# Patient Record
Sex: MALE | Race: WHITE | Employment: OTHER | URBAN - METROPOLITAN AREA
[De-identification: names, ages, dates, MRNs, and addresses within clinical notes are randomized per-mention and may not be internally consistent; named-entity substitution may affect disease eponyms.]

---

## 2017-06-13 ENCOUNTER — OFFICE VISIT (OUTPATIENT)
Dept: CARDIOLOGY CLINIC | Age: 72
End: 2017-06-13

## 2017-06-13 VITALS
SYSTOLIC BLOOD PRESSURE: 160 MMHG | DIASTOLIC BLOOD PRESSURE: 90 MMHG | HEIGHT: 71 IN | BODY MASS INDEX: 28.53 KG/M2 | HEART RATE: 60 BPM | RESPIRATION RATE: 16 BRPM | WEIGHT: 203.8 LBS | OXYGEN SATURATION: 98 %

## 2017-06-13 DIAGNOSIS — I50.33 ACUTE ON CHRONIC DIASTOLIC HEART FAILURE (HCC): Primary | ICD-10-CM

## 2017-06-13 RX ORDER — LISINOPRIL 10 MG/1
20 TABLET ORAL DAILY
COMMUNITY
End: 2020-01-17

## 2017-06-13 NOTE — MR AVS SNAPSHOT
Visit Information Date & Time Provider Department Dept. Phone Encounter #  
 6/13/2017  8:40 AM Esteban Lackey MD [facility] [phone] [id number] Upcoming Health Maintenance Date Due  
 FOOT EXAM Q1 3/20/1955 MICROALBUMIN Q1 3/20/1955 EYE EXAM RETINAL OR DILATED Q1 3/20/1953 DTaP/Tdap/Td series (1 - Tdap) 3/20/1964 FOBT Q 1 YEAR AGE 50-75 3/20/1993 ZOSTER VACCINE AGE 60> 3/20/2003 GLAUCOMA SCREENING Q2Y 3/20/2008 Pneumococcal 65+ Low/Medium Risk (1 of 2 - PCV13) 3/20/2008 MEDICARE YEARLY EXAM 3/20/2008 HEMOGLOBIN A1C Q6M 3/8/2017 INFLUENZA AGE 9 TO ADULT 8/1/2017 LIPID PANEL Q1 5/3/2018 Allergies as of 6/13/2017  Review Complete On: 6/13/2017 By: Marni Herman No Known Allergies Current Immunizations  Reviewed on 7/14/2016 No immunizations on file. Not reviewed this visit You Were Diagnosed With   
  
 Codes Comments Acute on chronic diastolic heart failure (HCC)    -  Primary ICD-10-CM: I50.33 ICD-9-CM: 428.33 Vitals BP Pulse Resp Height(growth percentile) Weight(growth percentile) SpO2  
 160/90 (BP 1 Location: Left arm, BP Patient Position: Sitting) 60 16 5' 11\" (1.803 m) 203 lb 12.8 oz (92.4 kg) 98% BMI Smoking Status 28.42 kg/m2 Former Smoker BMI and BSA Data Body Mass Index Body Surface Area  
 28.42 kg/m 2 2.15 m 2 Preferred Pharmacy Pharmacy Name Phone [facility] Snowden [facility] [phone] Your Updated Medication List  
  
   
This list is accurate as of: 6/13/17  9:20 AM.  Always use your most recent med list.  
  
  
  
  
 ACCU-CHEK SMARTVIEW TEST STRIP strip Generic drug:  glucose blood VI test strips Alpha Lipoic Acid 600 mg Cap Take  by mouth. apixaban 5 mg tablet Commonly known as:  Jeana Cork Take 1 Tab by mouth two (2) times a day. aspirin delayed-release 81 mg tablet Take  by mouth daily. atorvastatin 20 mg tablet Commonly known as:  LIPITOR Take 1 Tab by mouth nightly. cholecalciferol (vitamin D3) 2,000 unit Tab Take  by mouth. chromium picolinate 200 mcg Cap Take  by mouth.  
  
 cyanocobalamin 1,000 mcg sublingual tablet Commonly known as:  VITAMIN B-12 Take 5,000 mcg by mouth daily. folic acid 822 mcg tablet Take 800 mcg by mouth daily. furosemide 40 mg tablet Commonly known as:  LASIX TAKE 1 TABLET DAILY HumaLOG KwikPen 100 unit/mL kwikpen Generic drug:  insulin lispro 8 Units Before breakfast, lunch, and dinner. insulin glargine 100 unit/mL injection Commonly known as:  LANTUS  
14 Units by SubCUTAneous route nightly. Patient takes as directed. lisinopril 10 mg tablet Commonly known as:  Rexanne  Take 10 mg by mouth two (2) times a day. Magnesium Hydroxide 400 mg (170 mg) Chew Take 400 mg by mouth daily. Patient states triple magnesium 400 mg daily MEGARED OMEGA-3 KRILL OIL PO Take 350 mg by mouth daily. metFORMIN 500 mg tablet Commonly known as:  GLUCOPHAGE Take  by mouth two (2) times daily (with meals). metoprolol tartrate 50 mg tablet Commonly known as:  LOPRESSOR Take 50 mg by mouth two (2) times a day. multivitamin tablet Commonly known as:  ONE A DAY Take 1 Tab by mouth daily. potassium chloride SA 10 mEq capsule Commonly known as:  [place] Take 2 Caps by mouth daily. This was a different dose than you were previously taking!!  
  
 selenium 200 mcg Tab Take  by mouth. UBIQUINOL (BULK) Take 100 mg by mouth daily. vitamin A 10,000 unit capsule Commonly known as:  AQUASOL A Take 10,000 Units by mouth daily. VITAMIN B-1 PO Take 160 mg by mouth daily. vitamin E 400 unit capsule Commonly known as:  [address] Take  by mouth daily. zinc 15 mg Tab Take 30 mg by mouth daily. We Performed the Following AMB POC EKG ROUTINE W/ 12 LEADS, INTER & REP [46206 CPT(R)] Patient Instructions Lisinopril 10 mg twice a day Follow up with Marvin Raymond in 6 months Introducing [facility]! [facility] introduces [employer]ia patient portal. Now you can access parts of your medical record, email your doctor's office, and request medication refills online. 1. In your internet browser, go to [email or web address] 2. Click on the First Time User? Click Here link in the Sign In box. You will see the New Member Sign Up page. 3. Enter your Recognia Access Code exactly as it appears below. You will not need to use this code after youve completed the sign-up process. If you do not sign up before the expiration date, you must request a new code. · Recognia Access Code: [email or web address] Expires: 8/15/2017  7:33 AM 
 
4. Enter the last four digits of your Social Security Number (xxxx) and Date of Birth (mm/dd/yyyy) as indicated and click Submit. You will be taken to the next sign-up page. 5. Create a Recognia ID. This will be your Recognia login ID and cannot be changed, so think of one that is secure and easy to remember. 6. Create a Recognia password. You can change your password at any time. 7. Enter your Password Reset Question and Answer. This can be used at a later time if you forget your password. 8. Enter your e-mail address. You will receive e-mail notification when new information is available in [address] 9. Click Sign Up. You can now view and download portions of your medical record. 10. Click the Download Summary menu link to download a portable copy of your medical information. If you have questions, please visit the Frequently Asked Questions section of the Recognia website. Remember, Recognia is NOT to be used for urgent needs. For medical emergencies, dial 911. Now available from your iPhone and Android! Please provide this summary of care documentation to your next provider. Your primary care clinician is listed as Romayne Ruby. If you have any questions after today's visit, please call [phone].
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
## 2017-06-13 NOTE — PROGRESS NOTES
Visit Vitals    /90 (BP 1 Location: Left arm, BP Patient Position: Sitting)    Pulse 60    Resp 16    Ht 5' 11\" (1.803 m)    Wt 203 lb 12.8 oz (92.4 kg)    SpO2 98%    BMI 28.42 kg/m2

## 2017-10-11 NOTE — TELEPHONE ENCOUNTER
Requested Prescriptions     Pending Prescriptions Disp Refills    apixaban (ELIQUIS) 5 mg tablet 180 Tab 2     Last OV 6/13/17  Next OV 12/12/17  Pt is out of this medication and is leaving out of town tomorrow morning for at least 2 weeks. Please call pt when prescription is sent.     Pharmacy verified    Thank you, AP

## 2017-12-12 ENCOUNTER — OFFICE VISIT (OUTPATIENT)
Dept: CARDIOLOGY CLINIC | Age: 72
End: 2017-12-12

## 2017-12-12 VITALS
BODY MASS INDEX: 29.12 KG/M2 | HEART RATE: 71 BPM | HEIGHT: 71 IN | OXYGEN SATURATION: 98 % | RESPIRATION RATE: 16 BRPM | DIASTOLIC BLOOD PRESSURE: 80 MMHG | WEIGHT: 208 LBS | SYSTOLIC BLOOD PRESSURE: 120 MMHG

## 2017-12-12 DIAGNOSIS — I25.10 CORONARY ARTERY DISEASE INVOLVING NATIVE CORONARY ARTERY OF NATIVE HEART WITHOUT ANGINA PECTORIS: Primary | ICD-10-CM
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
## 2017-12-12 NOTE — MR AVS SNAPSHOT
Visit Information Date & Time Provider Department Dept. Phone Encounter #  
 12/12/2017 10:00 AM Junaid Padgett MD [facility] Jakob Ferris [phone] [id number] Upcoming Health Maintenance Date Due  
 FOOT EXAM Q1 3/20/1955 MICROALBUMIN Q1 3/20/1955 EYE EXAM RETINAL OR DILATED Q1 3/20/1953 DTaP/Tdap/Td series (1 - Tdap) 3/20/1964 FOBT Q 1 YEAR AGE 50-75 3/20/1993 ZOSTER VACCINE AGE 60> 1/20/2003 GLAUCOMA SCREENING Q2Y 3/20/2008 Pneumococcal 65+ Low/Medium Risk (1 of 2 - PCV13) 3/20/2008 MEDICARE YEARLY EXAM 3/20/2008 Influenza Age 5 to Adult 8/1/2017 HEMOGLOBIN A1C Q6M 11/3/2017 LIPID PANEL Q1 5/3/2018 Allergies as of 12/12/2017  Review Complete On: 12/12/2017 By: Junaid Padgett MD  
 No Known Allergies Current Immunizations  Reviewed on 7/14/2016 No immunizations on file. Not reviewed this visit You Were Diagnosed With   
  
 Codes Comments Coronary artery disease involving native coronary artery of native heart without angina pectoris    -  Primary ICD-10-CM: I25.10 ICD-9-CM: 414.01 Vitals BP Pulse Resp Height(growth percentile) Weight(growth percentile) SpO2  
 120/80 (BP 1 Location: Left arm, BP Patient Position: Sitting) 71 16 5' 11\" (1.803 m) 208 lb (94.3 kg) 98% BMI Smoking Status 29.01 kg/m2 Former Smoker Vitals History BMI and BSA Data Body Mass Index Body Surface Area  
 29.01 kg/m 2 2.17 m 2 Preferred Pharmacy Pharmacy Name Phone [address] [facility] [phone] Your Updated Medication List  
  
   
This list is accurate as of: 12/12/17 11:09 AM.  Always use your most recent med list.  
  
  
  
  
 ACCU-CHEK SMARTVIEW TEST STRIP strip Generic drug:  glucose blood VI test strips Alpha Lipoic Acid 600 mg Cap Take  by mouth. apixaban 5 mg tablet Commonly known as:  Timmothy Madison TAKE 1 TABLET TWICE A DAY  
  
 aspirin delayed-release 81 mg tablet Take  by mouth daily. atorvastatin 20 mg tablet Commonly known as:  LIPITOR  
TAKE 1 TABLET NIGHTLY  
  
 cholecalciferol (vitamin D3) 2,000 unit Tab Take  by mouth. chromium picolinate 200 mcg Cap Take  by mouth.  
  
 cyanocobalamin 1,000 mcg sublingual tablet Commonly known as:  VITAMIN B-12 Take 5,000 mcg by mouth daily. folic acid 698 mcg tablet Take 800 mcg by mouth daily. furosemide 40 mg tablet Commonly known as:  LASIX TAKE 1 TABLET DAILY HumaLOG KwikPen 100 unit/mL kwikpen Generic drug:  insulin lispro 8 Units Before breakfast, lunch, and dinner. insulin glargine 100 unit/mL injection Commonly known as:  LANTUS  
14 Units by SubCUTAneous route nightly. Patient takes as directed. lisinopril 10 mg tablet Commonly known as:  Marge Gem Take 10 mg by mouth two (2) times a day. Magnesium Hydroxide 400 mg (170 mg) Chew Take 400 mg by mouth daily. Patient states triple magnesium 400 mg daily MEGARED OMEGA-3 KRILL OIL PO Take 350 mg by mouth daily. metFORMIN 500 mg tablet Commonly known as:  GLUCOPHAGE Take  by mouth two (2) times daily (with meals). metoprolol tartrate 50 mg tablet Commonly known as:  LOPRESSOR Take 50 mg by mouth two (2) times a day. multivitamin tablet Commonly known as:  ONE A DAY Take 1 Tab by mouth daily. potassium chloride SA 10 mEq capsule Commonly known as:  MICRO-K  
TAKE 2 CAPSULES DAILY  
  
 selenium 200 mcg Tab Take  by mouth. UBIQUINOL (BULK) Take 100 mg by mouth daily. vitamin A 10,000 unit capsule Commonly known as:  AQUASOL A Take 10,000 Units by mouth daily. VITAMIN B-1 PO Take 160 mg by mouth daily. vitamin E 400 unit capsule Commonly known as:  [address] Take  by mouth daily. zinc 15 mg Tab Take 30 mg by mouth daily. We Performed the Following AMB POC EKG ROUTINE W/ 12 LEADS, INTER & REP [55588 CPT(R)] Patient Instructions Follow up with Romana Molina in 6 months Introducing [address]! Alisha Bee introduces [employer] patient portal. Now you can access parts of your medical record, email your doctor's office, and request medication refills online. 1. In your internet browser, go to [email or web address] 2. Click on the First Time User? Click Here link in the Sign In box. You will see the New Member Sign Up page. 3. Enter your [employer] Access Code exactly as it appears below. You will not need to use this code after youve completed the sign-up process. If you do not sign up before the expiration date, you must request a new code. · [employer] Access Code: [email or web address] Expires: 3/8/2018  8:36 AM 
 
4. Enter the last four digits of your Social Security Number (xxxx) and Date of Birth (mm/dd/yyyy) as indicated and click Submit. You will be taken to the next sign-up page. 5. Create a [employer] ID. This will be your [employer] login ID and cannot be changed, so think of one that is secure and easy to remember. 6. Create a [employer] password. You can change your password at any time. 7. Enter your Password Reset Question and Answer. This can be used at a later time if you forget your password. 8. Enter your e-mail address. You will receive e-mail notification when new information is available in [address] 9. Click Sign Up. You can now view and download portions of your medical record. 10. Click the Download Summary menu link to download a portable copy of your medical information. If you have questions, please visit the Frequently Asked Questions section of the Bellst[employer] website. Remember, [employer] is NOT to be used for urgent needs. For medical emergencies, dial 911. Now available from your iPhone and Android! Please provide this summary of care documentation to your next provider. Your primary care clinician is listed as Mary Jo Cardozo. If you have any questions after today's visit, please call [phone].

## 2018-06-12 ENCOUNTER — OFFICE VISIT (OUTPATIENT)
Dept: CARDIOLOGY CLINIC | Age: 73
End: 2018-06-12

## 2018-06-12 VITALS
SYSTOLIC BLOOD PRESSURE: 140 MMHG | DIASTOLIC BLOOD PRESSURE: 90 MMHG | WEIGHT: 204 LBS | RESPIRATION RATE: 16 BRPM | OXYGEN SATURATION: 98 % | BODY MASS INDEX: 28.56 KG/M2 | HEIGHT: 71 IN | HEART RATE: 61 BPM

## 2018-06-12 DIAGNOSIS — I48.0 PAROXYSMAL ATRIAL FIBRILLATION (HCC): Primary | ICD-10-CM

## 2018-06-12 RX ORDER — HYDROCHLOROTHIAZIDE 12.5 MG/1
25 TABLET ORAL DAILY
COMMUNITY
Start: 2018-04-17

## 2018-06-12 RX ORDER — ATORVASTATIN CALCIUM 40 MG/1
40 TABLET, FILM COATED ORAL
COMMUNITY
End: 2018-06-15 | Stop reason: SDUPTHER

## 2018-06-12 RX ORDER — ASPIRIN 81 MG/1
81 TABLET ORAL DAILY
COMMUNITY
End: 2021-04-26 | Stop reason: SDUPTHER

## 2018-06-12 NOTE — PATIENT INSTRUCTIONS
Stop Eliquis    Increase Aspirin 81 mg (2 Tabs) daily    Increase Lipitor 40 mg daily    Follow up with Luz Luong in 6 months

## 2018-06-12 NOTE — MR AVS SNAPSHOT
[address] [address] [facility] [place] [facility] 
[phone] Patient: Gina Dick MRN: [id number] COL:[birth year] Visit Information Date & Time Provider Department Dept. Phone Encounter #  
 6/12/2018 11:20 AM Abena Kilpatrick MD [facility] Jammie Jenkins [phone] [id number] Follow-up Instructions Return in about 6 months (around 12/12/2018). Follow-up and Disposition History Upcoming Health Maintenance Date Due  
 FOOT EXAM Q1 3/20/1955 MICROALBUMIN Q1 3/20/1955 EYE EXAM RETINAL OR DILATED Q1 3/20/1953 DTaP/Tdap/Td series (1 - Tdap) 3/20/1964 ZOSTER VACCINE AGE 60> 1/20/2003 GLAUCOMA SCREENING Q2Y 3/20/2008 Pneumococcal 65+ Low/Medium Risk (1 of 2 - PCV13) 3/20/2008 HEMOGLOBIN A1C Q6M 11/3/2017 MEDICARE YEARLY EXAM 3/14/2018 LIPID PANEL Q1 5/3/2018 Influenza Age 5 to Adult 8/1/2018 Allergies as of 6/12/2018  Review Complete On: 6/12/2018 By: Abena Kilpatrick MD  
 No Known Allergies Current Immunizations  Reviewed on 7/14/2016 No immunizations on file. Not reviewed this visit You Were Diagnosed With   
  
 Codes Comments Paroxysmal atrial fibrillation (HCC)    -  Primary ICD-10-CM: I48.0 ICD-9-CM: 427.31 Vitals BP Pulse Resp Height(growth percentile) Weight(growth percentile) SpO2  
 140/90 (BP 1 Location: Left arm, BP Patient Position: Sitting) 61 16 5' 11\" (1.803 m) 204 lb (92.5 kg) 98% BMI Smoking Status 28.45 kg/m2 Former Smoker BMI and BSA Data Body Mass Index Body Surface Area  
 28.45 kg/m 2 2.15 m 2 Preferred Pharmacy Pharmacy Name Phone 100 [address] [facility] [phone] Your Updated Medication List  
  
   
This list is accurate as of 6/12/18 11:30 AM.  Always use your most recent med list.  
  
  
  
  
 ACCU-CHEK SMARTVIEW TEST STRIP strip Generic drug:  glucose blood VI test strips Alpha Lipoic Acid 600 mg Cap Take  by mouth. aspirin delayed-release 81 mg tablet Take 81 mg by mouth daily. 2 tablets daily  
  
 cholecalciferol (vitamin D3) 2,000 unit Tab Take  by mouth. chromium picolinate 200 mcg Cap Take  by mouth.  
  
 cyanocobalamin 1,000 mcg sublingual tablet Commonly known as:  VITAMIN B-12 Take 5,000 mcg by mouth daily. folic acid 266 mcg tablet Take 800 mcg by mouth daily. HumaLOG KwikPen Insulin 100 unit/mL kwikpen Generic drug:  insulin lispro 8 Units Before breakfast, lunch, and dinner. hydroCHLOROthiazide 12.5 mg tablet Commonly known as:  HYDRODIURIL Take 12.5 mg by mouth every other day. insulin glargine 100 unit/mL injection Commonly known as:  LANTUS  
14 Units by SubCUTAneous route nightly. Patient takes as directed. LIPITOR 40 mg tablet Generic drug:  atorvastatin Take 40 mg by mouth nightly. lisinopril 10 mg tablet Commonly known as:  Lollie Jump Take 20 mg by mouth two (2) times a day. Magnesium Hydroxide 400 mg (170 mg) Chew Take 400 mg by mouth daily. Patient states triple magnesium 400 mg daily MEGARED OMEGA-3 KRILL OIL PO Take 350 mg by mouth daily. metFORMIN 500 mg tablet Commonly known as:  GLUCOPHAGE Take  by mouth two (2) times daily (with meals). metoprolol tartrate 50 mg tablet Commonly known as:  LOPRESSOR Take 50 mg by mouth two (2) times a day. multivitamin tablet Commonly known as:  ONE A DAY Take 1 Tab by mouth daily. selenium 200 mcg Tab Take  by mouth. UBIQUINOL (BULK) Take 100 mg by mouth daily. vitamin A 10,000 unit capsule Commonly known as:  AQUASOL A Take 10,000 Units by mouth daily. VITAMIN B-1 PO Take 160 mg by mouth daily. vitamin E 400 unit capsule Commonly known as:  [address] Take  by mouth daily. zinc 15 mg Tab Take 30 mg by mouth daily. We Performed the Following AMB POC EKG ROUTINE W/ 12 LEADS, INTER & REP [53622 CPT(R)] Follow-up Instructions Return in about 6 months (around 12/12/2018). Patient Instructions Stop Eliquis Increase Aspirin 81 mg (2 Tabs) daily Increase Lipitor 40 mg daily Follow up with Velma Cruz in 6 months Introducing [facility]! Carley Serrano introduces [employer] patient portal. Now you can access parts of your medical record, email your doctor's office, and request medication refills online. 1. In your internet browser, go to [email or web address] 2. Click on the First Time User? Click Here link in the Sign In box. You will see the New Member Sign Up page. 3. Enter your [employer] Access Code exactly as it appears below. You will not need to use this code after youve completed the sign-up process. If you do not sign up before the expiration date, you must request a new code. · [employer] Access Code: [email or web address] Expires: 8/21/2018  7:21 AM 
 
4. Enter the last four digits of your Social Security Number (xxxx) and Date of Birth (mm/dd/yyyy) as indicated and click Submit. You will be taken to the next sign-up page. 5. Create a [employer] ID. This will be your [employer] login ID and cannot be changed, so think of one that is secure and easy to remember. 6. Create a [employer] password. You can change your password at any time. 7. Enter your Password Reset Question and Answer. This can be used at a later time if you forget your password. 8. Enter your e-mail address. You will receive e-mail notification when new information is available in [address] 9. Click Sign Up. You can now view and download portions of your medical record. 10. Click the Download Summary menu link to download a portable copy of your medical information.  
 
If you have questions, please visit the Frequently Asked Questions section of the v2tel. Remember, [employer] is NOT to be used for urgent needs. For medical emergencies, dial 911. Now available from your iPhone and Android! Please provide this summary of care documentation to your next provider. Your primary care clinician is listed as Cleatus Heads. If you have any questions after today's visit, please call [phone].
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
## 2018-06-15 ENCOUNTER — TELEPHONE (OUTPATIENT)
Dept: CARDIOLOGY CLINIC | Age: 73
End: 2018-06-15

## 2018-06-15 RX ORDER — ATORVASTATIN CALCIUM 40 MG/1
40 TABLET, FILM COATED ORAL
Qty: 90 TAB | Refills: 3 | Status: SHIPPED | OUTPATIENT
Start: 2018-06-15 | End: 2019-01-21 | Stop reason: ALTCHOICE

## 2018-06-15 NOTE — TELEPHONE ENCOUNTER
Pt called to discuss changed medication that was recently done on his Atorvastatim(Lipitor) 20mg, wanting to be assured that the information was submitted to his mail ordering supply. Pt can be reached at #[phone].   Thanks

## 2018-06-15 NOTE — TELEPHONE ENCOUNTER
Verified patient with two patient identifiers. Spoke with patient and told him that I [facility] his Lipitor 40 mg # 90 [facility]

## 2019-01-21 ENCOUNTER — OFFICE VISIT (OUTPATIENT)
Dept: CARDIOLOGY CLINIC | Age: 74
End: 2019-01-21

## 2019-01-21 VITALS
HEART RATE: 57 BPM | DIASTOLIC BLOOD PRESSURE: 92 MMHG | OXYGEN SATURATION: 98 % | HEIGHT: 71 IN | SYSTOLIC BLOOD PRESSURE: 148 MMHG | RESPIRATION RATE: 19 BRPM | WEIGHT: 207 LBS | BODY MASS INDEX: 28.98 KG/M2

## 2019-01-21 DIAGNOSIS — I25.10 CORONARY ARTERY DISEASE INVOLVING NATIVE CORONARY ARTERY WITHOUT ANGINA PECTORIS, UNSPECIFIED WHETHER NATIVE OR TRANSPLANTED HEART: Primary | ICD-10-CM

## 2019-01-21 NOTE — PROGRESS NOTES
Visit Vitals BP (!) 148/92 (BP 1 Location: Right arm, BP Patient Position: Sitting) Pulse (!) 57 Resp 19 Ht 5' 11\" (1.803 m) Wt 207 lb (93.9 kg) SpO2 98% BMI 28.87 kg/m²

## 2019-01-21 NOTE — PROGRESS NOTES
1. Have you been to the ER, urgent care clinic since your last visit? Hospitalized since your last visit? No 
 
2. Have you seen or consulted any other health care providers outside of the [address] [place] [address] since your last visit? Include any pap smears or colon screening. No 
 
3) Do you have an Advance Directive on file? no 
 
Patient is accompanied by self I have received verbal consent from Zainab Rios to discuss any/all medical information while they are present in the room. Visit Vitals BP (!) 148/92 (BP 1 Location: Right arm, BP Patient Position: Sitting) Pulse (!) 57 Resp 19 Ht 5' 11\" (1.803 m) Wt 207 lb (93.9 kg) SpO2 98% BMI 28.87 kg/m²
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
## 2019-01-30 ENCOUNTER — TELEPHONE (OUTPATIENT)
Dept: CARDIOLOGY CLINIC | Age: 74
End: 2019-01-30

## 2019-01-30 NOTE — TELEPHONE ENCOUNTER
[id number] [facility] called from [place] stating she would like the pt bp reading from his last office visit.   Thanks

## 2019-01-30 NOTE — TELEPHONE ENCOUNTER
[facility] states that they wanted to see what the last BP check was and when his next appt is. Advised him that I am unable to give out this information without patient permission and that they can contact the patient for this information.

## 2019-07-16 ENCOUNTER — OFFICE VISIT (OUTPATIENT)
Dept: CARDIOLOGY CLINIC | Age: 74
End: 2019-07-16

## 2019-07-16 VITALS
HEIGHT: 71 IN | HEART RATE: 61 BPM | DIASTOLIC BLOOD PRESSURE: 80 MMHG | WEIGHT: 200 LBS | BODY MASS INDEX: 28 KG/M2 | SYSTOLIC BLOOD PRESSURE: 120 MMHG | OXYGEN SATURATION: 98 % | RESPIRATION RATE: 18 BRPM

## 2019-07-16 DIAGNOSIS — I25.10 CORONARY ARTERY DISEASE INVOLVING NATIVE CORONARY ARTERY WITHOUT ANGINA PECTORIS, UNSPECIFIED WHETHER NATIVE OR TRANSPLANTED HEART: Primary | ICD-10-CM
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
## 2019-07-16 NOTE — PROGRESS NOTES
Visit Vitals  /80 (BP 1 Location: Left arm, BP Patient Position: Sitting)   Pulse 61   Resp 18   Ht 5' 11\" (1.803 m)   Wt 200 lb (90.7 kg)   SpO2 98%   BMI 27.89 kg/m²

## 2019-08-09 RX ORDER — LANOLIN ALCOHOL/MO/W.PET/CERES
400 CREAM (GRAM) TOPICAL DAILY
COMMUNITY

## 2019-08-09 RX ORDER — ASCORBIC ACID 500 MG
500 TABLET ORAL DAILY
COMMUNITY

## 2019-08-09 NOTE — PERIOP NOTES
PAT TELEPHONE INTERVIEW COMPLETED. INSTRUCTIONS GIVEN ON MEDICATIONS TO STOP TAKING AND MEDICATIONS TO TAKE THE DAY OF SURGERY. PATIENT ADVISED THAT HE MAY EAT AND DRINK LIQUIDS 8 HOURS PRIOR TO SURGERY. INSTRUCTIONS GIVEN ON USE OF CHLORHEXIDINE SOAP THE EVENING BEFORE AND THE MORNING OF SURGEYR. PATIENT VOICED UNDERSTANDING OF SAME.

## 2019-08-13 ENCOUNTER — ANESTHESIA EVENT (OUTPATIENT)
Dept: SURGERY | Age: 74
End: 2019-08-13
Payer: MEDICARE

## 2019-08-14 ENCOUNTER — APPOINTMENT (OUTPATIENT)
Dept: GENERAL RADIOLOGY | Age: 74
End: 2019-08-14
Attending: PODIATRIST
Payer: MEDICARE

## 2019-08-14 ENCOUNTER — ANESTHESIA (OUTPATIENT)
Dept: SURGERY | Age: 74
End: 2019-08-14
Payer: MEDICARE

## 2019-08-14 ENCOUNTER — HOSPITAL ENCOUNTER (OUTPATIENT)
Age: 74
Setting detail: OUTPATIENT SURGERY
Discharge: HOME | End: 2019-08-14
Attending: PODIATRIST | Admitting: PODIATRIST
Payer: MEDICARE

## 2019-08-14 VITALS
OXYGEN SATURATION: 97 % | BODY MASS INDEX: 28 KG/M2 | HEIGHT: 71 IN | SYSTOLIC BLOOD PRESSURE: 170 MMHG | DIASTOLIC BLOOD PRESSURE: 87 MMHG | HEART RATE: 65 BPM | WEIGHT: 200 LBS | RESPIRATION RATE: 22 BRPM | TEMPERATURE: 98.2 F

## 2019-08-14 LAB
GLUCOSE BLD STRIP.AUTO-MCNC: 127 MG/DL (ref 65–100)
GLUCOSE BLD STRIP.AUTO-MCNC: 151 MG/DL (ref 65–100)
SERVICE CMNT-IMP: ABNORMAL
SERVICE CMNT-IMP: ABNORMAL

## 2019-08-14 PROCEDURE — 77030039497 HC CST PAD STERILE CHCS -A: Performed by: PODIATRIST

## 2019-08-14 PROCEDURE — 74011000250 HC RX REV CODE- 250: Performed by: NURSE ANESTHETIST, CERTIFIED REGISTERED

## 2019-08-14 PROCEDURE — 74011250636 HC RX REV CODE- 250/636: Performed by: NURSE ANESTHETIST, CERTIFIED REGISTERED

## 2019-08-14 PROCEDURE — 77030006831 HC BLD SAW SAG MCRA -B: Performed by: PODIATRIST

## 2019-08-14 PROCEDURE — 77030031139 HC SUT VCRL2 J&J -A: Performed by: PODIATRIST

## 2019-08-14 PROCEDURE — 87205 SMEAR GRAM STAIN: CPT

## 2019-08-14 PROCEDURE — 74011000250 HC RX REV CODE- 250: Performed by: ANESTHESIOLOGY

## 2019-08-14 PROCEDURE — 77030020782 HC GWN BAIR PAWS FLX 3M -B

## 2019-08-14 PROCEDURE — P9045 ALBUMIN (HUMAN), 5%, 250 ML: HCPCS | Performed by: NURSE ANESTHETIST, CERTIFIED REGISTERED

## 2019-08-14 PROCEDURE — 87075 CULTR BACTERIA EXCEPT BLOOD: CPT

## 2019-08-14 PROCEDURE — 77030021122 HC SPLNT MAT FST BSNM -A: Performed by: PODIATRIST

## 2019-08-14 PROCEDURE — 77030027714 HC DRN WND KT TLS STRY -B: Performed by: PODIATRIST

## 2019-08-14 PROCEDURE — 77030018836 HC SOL IRR NACL ICUM -A: Performed by: PODIATRIST

## 2019-08-14 PROCEDURE — 88311 DECALCIFY TISSUE: CPT

## 2019-08-14 PROCEDURE — 77030018846 HC SOL IRR STRL H20 ICUM -A: Performed by: PODIATRIST

## 2019-08-14 PROCEDURE — 77030011640 HC PAD GRND REM COVD -A: Performed by: PODIATRIST

## 2019-08-14 PROCEDURE — 87077 CULTURE AEROBIC IDENTIFY: CPT

## 2019-08-14 PROCEDURE — 87176 TISSUE HOMOGENIZATION CULTR: CPT

## 2019-08-14 PROCEDURE — 77030002933 HC SUT MCRYL J&J -A: Performed by: PODIATRIST

## 2019-08-14 PROCEDURE — 82962 GLUCOSE BLOOD TEST: CPT

## 2019-08-14 PROCEDURE — 77030000032 HC CUF TRNQT ZIMM -B: Performed by: PODIATRIST

## 2019-08-14 PROCEDURE — 74011000250 HC RX REV CODE- 250: Performed by: PODIATRIST

## 2019-08-14 PROCEDURE — 76060000035 HC ANESTHESIA 2 TO 2.5 HR: Performed by: PODIATRIST

## 2019-08-14 PROCEDURE — 76010000131 HC OR TIME 2 TO 2.5 HR: Performed by: PODIATRIST

## 2019-08-14 PROCEDURE — 76210000016 HC OR PH I REC 1 TO 1.5 HR: Performed by: PODIATRIST

## 2019-08-14 PROCEDURE — 88305 TISSUE EXAM BY PATHOLOGIST: CPT

## 2019-08-14 PROCEDURE — 77030002916 HC SUT ETHLN J&J -A: Performed by: PODIATRIST

## 2019-08-14 PROCEDURE — 87186 SC STD MICRODIL/AGAR DIL: CPT

## 2019-08-14 PROCEDURE — 73620 X-RAY EXAM OF FOOT: CPT

## 2019-08-14 PROCEDURE — 74011250636 HC RX REV CODE- 250/636: Performed by: ANESTHESIOLOGY

## 2019-08-14 PROCEDURE — 74011250636 HC RX REV CODE- 250/636: Performed by: PODIATRIST

## 2019-08-14 PROCEDURE — 76210000026 HC REC RM PH II 1 TO 1.5 HR: Performed by: PODIATRIST

## 2019-08-14 RX ORDER — FENTANYL CITRATE 50 UG/ML
INJECTION, SOLUTION INTRAMUSCULAR; INTRAVENOUS AS NEEDED
Status: DISCONTINUED | OUTPATIENT
Start: 2019-08-14 | End: 2019-08-14 | Stop reason: HOSPADM

## 2019-08-14 RX ORDER — SODIUM CHLORIDE, SODIUM LACTATE, POTASSIUM CHLORIDE, CALCIUM CHLORIDE 600; 310; 30; 20 MG/100ML; MG/100ML; MG/100ML; MG/100ML
INJECTION, SOLUTION INTRAVENOUS
Status: DISCONTINUED | OUTPATIENT
Start: 2019-08-14 | End: 2019-08-14 | Stop reason: HOSPADM

## 2019-08-14 RX ORDER — LIDOCAINE HYDROCHLORIDE 20 MG/ML
INJECTION, SOLUTION EPIDURAL; INFILTRATION; INTRACAUDAL; PERINEURAL AS NEEDED
Status: DISCONTINUED | OUTPATIENT
Start: 2019-08-14 | End: 2019-08-14 | Stop reason: HOSPADM

## 2019-08-14 RX ORDER — ALBUMIN HUMAN 50 G/1000ML
SOLUTION INTRAVENOUS AS NEEDED
Status: DISCONTINUED | OUTPATIENT
Start: 2019-08-14 | End: 2019-08-14 | Stop reason: HOSPADM

## 2019-08-14 RX ORDER — SODIUM CHLORIDE 0.9 % (FLUSH) 0.9 %
5-40 SYRINGE (ML) INJECTION EVERY 8 HOURS
Status: DISCONTINUED | OUTPATIENT
Start: 2019-08-14 | End: 2019-08-14 | Stop reason: HOSPADM

## 2019-08-14 RX ORDER — CEFAZOLIN SODIUM/WATER 2 G/20 ML
2 SYRINGE (ML) INTRAVENOUS
Status: COMPLETED | OUTPATIENT
Start: 2019-08-14 | End: 2019-08-14

## 2019-08-14 RX ORDER — SODIUM CHLORIDE 0.9 % (FLUSH) 0.9 %
5-40 SYRINGE (ML) INJECTION AS NEEDED
Status: CANCELLED | OUTPATIENT
Start: 2019-08-14

## 2019-08-14 RX ORDER — EPHEDRINE SULFATE/0.9% NACL/PF 50 MG/5 ML
SYRINGE (ML) INTRAVENOUS AS NEEDED
Status: DISCONTINUED | OUTPATIENT
Start: 2019-08-14 | End: 2019-08-14 | Stop reason: HOSPADM

## 2019-08-14 RX ORDER — SUCCINYLCHOLINE CHLORIDE 20 MG/ML
INJECTION INTRAMUSCULAR; INTRAVENOUS AS NEEDED
Status: DISCONTINUED | OUTPATIENT
Start: 2019-08-14 | End: 2019-08-14 | Stop reason: HOSPADM

## 2019-08-14 RX ORDER — PHENYLEPHRINE HCL IN 0.9% NACL 0.4MG/10ML
SYRINGE (ML) INTRAVENOUS AS NEEDED
Status: DISCONTINUED | OUTPATIENT
Start: 2019-08-14 | End: 2019-08-14 | Stop reason: HOSPADM

## 2019-08-14 RX ORDER — ONDANSETRON 2 MG/ML
4 INJECTION INTRAMUSCULAR; INTRAVENOUS AS NEEDED
Status: DISCONTINUED | OUTPATIENT
Start: 2019-08-14 | End: 2019-08-14 | Stop reason: HOSPADM

## 2019-08-14 RX ORDER — ONDANSETRON 2 MG/ML
INJECTION INTRAMUSCULAR; INTRAVENOUS AS NEEDED
Status: DISCONTINUED | OUTPATIENT
Start: 2019-08-14 | End: 2019-08-14 | Stop reason: HOSPADM

## 2019-08-14 RX ORDER — SODIUM CHLORIDE, SODIUM LACTATE, POTASSIUM CHLORIDE, CALCIUM CHLORIDE 600; 310; 30; 20 MG/100ML; MG/100ML; MG/100ML; MG/100ML
50 INJECTION, SOLUTION INTRAVENOUS CONTINUOUS
Status: DISCONTINUED | OUTPATIENT
Start: 2019-08-14 | End: 2019-08-14 | Stop reason: HOSPADM

## 2019-08-14 RX ORDER — KETOROLAC TROMETHAMINE 5 MG/ML
1 SOLUTION OPHTHALMIC ONCE
Status: COMPLETED | OUTPATIENT
Start: 2019-08-14 | End: 2019-08-14

## 2019-08-14 RX ORDER — SODIUM CHLORIDE 0.9 % (FLUSH) 0.9 %
5-40 SYRINGE (ML) INJECTION AS NEEDED
Status: DISCONTINUED | OUTPATIENT
Start: 2019-08-14 | End: 2019-08-14 | Stop reason: HOSPADM

## 2019-08-14 RX ORDER — HYDROMORPHONE HYDROCHLORIDE 1 MG/ML
0.2 INJECTION, SOLUTION INTRAMUSCULAR; INTRAVENOUS; SUBCUTANEOUS
Status: DISCONTINUED | OUTPATIENT
Start: 2019-08-14 | End: 2019-08-14 | Stop reason: HOSPADM

## 2019-08-14 RX ORDER — BUPIVACAINE HYDROCHLORIDE 5 MG/ML
INJECTION, SOLUTION EPIDURAL; INTRACAUDAL AS NEEDED
Status: DISCONTINUED | OUTPATIENT
Start: 2019-08-14 | End: 2019-08-14 | Stop reason: HOSPADM

## 2019-08-14 RX ORDER — FENTANYL CITRATE 50 UG/ML
25 INJECTION, SOLUTION INTRAMUSCULAR; INTRAVENOUS
Status: DISCONTINUED | OUTPATIENT
Start: 2019-08-14 | End: 2019-08-14 | Stop reason: HOSPADM

## 2019-08-14 RX ORDER — PROPOFOL 10 MG/ML
INJECTION, EMULSION INTRAVENOUS AS NEEDED
Status: DISCONTINUED | OUTPATIENT
Start: 2019-08-14 | End: 2019-08-14 | Stop reason: HOSPADM

## 2019-08-14 RX ORDER — ROCURONIUM BROMIDE 10 MG/ML
INJECTION, SOLUTION INTRAVENOUS AS NEEDED
Status: DISCONTINUED | OUTPATIENT
Start: 2019-08-14 | End: 2019-08-14 | Stop reason: HOSPADM

## 2019-08-14 RX ORDER — SODIUM CHLORIDE 0.9 % (FLUSH) 0.9 %
5-40 SYRINGE (ML) INJECTION EVERY 8 HOURS
Status: CANCELLED | OUTPATIENT
Start: 2019-08-14

## 2019-08-14 RX ORDER — OXYCODONE AND ACETAMINOPHEN 5; 325 MG/1; MG/1
2 TABLET ORAL
Status: CANCELLED | OUTPATIENT
Start: 2019-08-14

## 2019-08-14 RX ORDER — LIDOCAINE HYDROCHLORIDE 10 MG/ML
0.1 INJECTION, SOLUTION EPIDURAL; INFILTRATION; INTRACAUDAL; PERINEURAL AS NEEDED
Status: DISCONTINUED | OUTPATIENT
Start: 2019-08-14 | End: 2019-08-14 | Stop reason: HOSPADM

## 2019-08-14 RX ADMIN — PROPOFOL 50 MG: 10 INJECTION, EMULSION INTRAVENOUS at 13:58

## 2019-08-14 RX ADMIN — ROCURONIUM BROMIDE 5 MG: 10 SOLUTION INTRAVENOUS at 12:41

## 2019-08-14 RX ADMIN — PROPOFOL 150 MG: 10 INJECTION, EMULSION INTRAVENOUS at 12:41

## 2019-08-14 RX ADMIN — FENTANYL CITRATE 75 MCG: 50 INJECTION, SOLUTION INTRAMUSCULAR; INTRAVENOUS at 12:41

## 2019-08-14 RX ADMIN — ALBUMIN (HUMAN) 250 ML: 12.5 INJECTION, SOLUTION INTRAVENOUS at 13:31

## 2019-08-14 RX ADMIN — LIDOCAINE HYDROCHLORIDE 90 MG: 20 INJECTION, SOLUTION EPIDURAL; INFILTRATION; INTRACAUDAL; PERINEURAL at 12:41

## 2019-08-14 RX ADMIN — Medication 10 MG: at 13:31

## 2019-08-14 RX ADMIN — SUCCINYLCHOLINE CHLORIDE 140 MG: 20 INJECTION, SOLUTION INTRAMUSCULAR; INTRAVENOUS at 12:41

## 2019-08-14 RX ADMIN — Medication 2 G: at 12:55

## 2019-08-14 RX ADMIN — ONDANSETRON HYDROCHLORIDE 4 MG: 2 INJECTION, SOLUTION INTRAMUSCULAR; INTRAVENOUS at 14:14

## 2019-08-14 RX ADMIN — Medication 40 MCG: at 13:01

## 2019-08-14 RX ADMIN — KETOROLAC TROMETHAMINE 1 DROP: 5 SOLUTION/ DROPS OPHTHALMIC at 15:58

## 2019-08-14 RX ADMIN — SODIUM CHLORIDE, POTASSIUM CHLORIDE, SODIUM LACTATE AND CALCIUM CHLORIDE: 600; 310; 30; 20 INJECTION, SOLUTION INTRAVENOUS at 12:29

## 2019-08-14 RX ADMIN — SODIUM CHLORIDE, SODIUM LACTATE, POTASSIUM CHLORIDE, AND CALCIUM CHLORIDE 50 ML/HR: 600; 310; 30; 20 INJECTION, SOLUTION INTRAVENOUS at 11:03

## 2019-08-14 NOTE — ANESTHESIA POSTPROCEDURE EVALUATION
Procedure(s):  TRANSMETATARSAL AMPUTATION, OPEN BONE BIOPSIES, ACHILLES TENDON LENGTHENING  LEFT FOOT. general    Anesthesia Post Evaluation        Patient location during evaluation: PACU  Patient participation: complete - patient participated  Level of consciousness: awake and alert  Pain management: adequate  Airway patency: patent  Anesthetic complications: no  Cardiovascular status: acceptable  Respiratory status: acceptable  Hydration status: acceptable  Comments: I have seen and evaluated the patient and is ready for discharge. Susi Javed MD    Post anesthesia nausea and vomiting:  none      Vitals Value Taken Time   /80 8/14/2019  3:05 PM   Temp     Pulse 67 8/14/2019  3:10 PM   Resp 13 8/14/2019  3:10 PM   SpO2 95 % 8/14/2019  3:10 PM   Vitals shown include unvalidated device data.

## 2019-08-14 NOTE — PERIOP NOTES
Dr. Angelique Chino at bedside.   Orders received to instill Acular 1 drop in left eye and patch eye for eye rest.

## 2019-08-14 NOTE — PERIOP NOTES
1,000 mL of 0.9% Normal Saline added to sterile field for PRN irrigation throughout procedure. 1,000 mL of 0.9% Normal Saline hung for suction/irrigation during procedure. 1,000 mL of Sterile Water added to field for cleaning of instrumentation.

## 2019-08-14 NOTE — DISCHARGE INSTRUCTIONS
Elevate extremity above the level of your heart as much as possible. No weight bearing left lower extremity until you see Dr. Josep Riddle for your follow-up appointment. Wear the Prevalon air boot at all time to protect foot/left lower extremity. DISCHARGE SUMMARY from Nurse    PATIENT INSTRUCTIONS:    After general anesthesia or intravenous sedation, for 24 hours or while taking prescription Narcotics:  · Limit your activities  · Do not drive and operate hazardous machinery  · Do not make important personal or business decisions  · Do  not drink alcoholic beverages  · If you have not urinated within 8 hours after discharge, please contact your surgeon on call. Report the following to your surgeon:  · Excessive pain, swelling, redness or odor of or around the surgical area  · Temperature over 100.5  · Nausea and vomiting lasting longer than 4 hours or if unable to take medications  · Any signs of decreased circulation or nerve impairment to extremity: change in color, persistent  numbness, tingling, coldness or increase pain  · Any questions    What to do at Home:  Recommended activity: See surgical instructions,     These are general instructions for a healthy lifestyle:    No smoking/ No tobacco products/ Avoid exposure to second hand smoke  Surgeon General's Warning:  Quitting smoking now greatly reduces serious risk to your health. Obesity, smoking, and sedentary lifestyle greatly increases your risk for illness    A healthy diet, regular physical exercise & weight monitoring are important for maintaining a healthy lifestyle    You may be retaining fluid if you have a history of heart failure or if you experience any of the following symptoms:  Weight gain of 3 pounds or more overnight or 5 pounds in a week, increased swelling in our hands or feet or shortness of breath while lying flat in bed.   Please call your doctor as soon as you notice any of these symptoms; do not wait until your next office visit.        The discharge information has been reviewed with the patient and daughter. The patient and daughter verbalized understanding. Discharge medications reviewed with the patient and daughter and appropriate educational materials and side effects teaching were provided.   ___________________________________________________________________________________________________________________________________

## 2019-08-14 NOTE — PERIOP NOTES
Pt c/o left eye discomfort. Left eye is red and watering. Dr. Justin Garcia called and made aware. Dr. Justin Garcia states he will be over to assess pt.

## 2019-08-14 NOTE — BRIEF OP NOTE
BRIEF OPERATIVE NOTE    Date of Procedure: 8/14/2019   Preoperative Diagnosis: OSTEOMYELITIS, ULCER WITH NECROTIC BONE, EQUINUS LEFT FOOT  Postoperative Diagnosis: OSTEOMYELITIS, ULCER WITH NECROTIC BONE, EQUINUS LEFT FOOT    Procedure(s):  TRANSMETATARSAL AMPUTATION LEFT,    ACHILLES TENDON LENGTHENING  LEFT   OPEN BONE BIOPSIES LEFT  Surgeon(s) and Role:     Daniel Mcclure DPM - Primary         Surgical Assistant: NONE    Surgical Staff:  Circ-1: Morris Wick RN  Scrub Tech-1: Josephine Spears  Event Time In Time Out   Incision Start 1304    Incision Close 1437      Anesthesia: General   Estimated Blood Loss: 100CC  Specimens:   ID Type Source Tests Collected by Time Destination   1 : LEFT FOREFOOT Fresh Foot, left  Virginie Pellet, [facility] 8/14/2019 1327 Pathology   2 : BONE BIOPSY- PROXIMAL MARGIN 2ND METATARSAL LEFT FOOT Fresh Foot, left  Virginie Pellet, [facility] 8/14/2019 1330 Pathology   3 : BONE BIOPSY- PROXIMAL MARGIN 3RD METATARSAL LEFT FOOT Fresh Foot, left  Virginie Pellet, [facility] 8/14/2019 1330 Pathology   1 : BONE CULTURE PROXIMAL MARGIN 2ND METATARSAL LEFT FOOT Bone Foot, left CULTURE, AEROBIC AND ANAEROBIC Virginie Pellet, [facility] 8/14/2019 1330 Microbiology   2 : BONE CULTURE PROXIMAL MARGIN 3RD METATARSAL LEFT FOOT Bone Foot, left CULTURE, AEROBIC AND ANAEROBIC Virginie Pellet, [facility] 8/14/2019 1330 Microbiology      Findings: AS PER PREOP, NONVIABLE BONE AND SOFT TISSUE TO THE LEFT FOREFOOT  Complications: NONE  Implants: * No implants in log *

## 2019-08-14 NOTE — ANESTHESIA PREPROCEDURE EVALUATION
65 yo hx of afib, CAD s/p CABG, EF 50%, HTN, previous BKA. Now here for transmetatarsal amputation on the other foot. Anesthetic History   No history of anesthetic complications            Review of Systems / Medical History  Patient summary reviewed, nursing notes reviewed and pertinent labs reviewed    Pulmonary  Within defined limits                 Neuro/Psych   Within defined limits           Cardiovascular    Hypertension        Dysrhythmias   CAD         GI/Hepatic/Renal  Within defined limits              Endo/Other    Diabetes         Other Findings              Physical Exam    Airway  Mallampati: II  TM Distance: 4 - 6 cm  Neck ROM: normal range of motion   Mouth opening: Normal     Cardiovascular    Rhythm: regular  Rate: normal         Dental    Dentition: Poor dentition     Pulmonary  Breath sounds clear to auscultation               Abdominal  Abdominal exam normal       Other Findings            Anesthetic Plan    ASA: 3  Anesthesia type: general          Induction: Intravenous  Anesthetic plan and risks discussed with: Patient      Plan for GA, patient refused regional block due to lack of feeling on foot.

## 2019-08-15 NOTE — OP NOTES
[facility]  OPERATIVE REPORT    Name:  Mikayla José  MR#:  [id number]  :  1945  ACCOUNT #:  [de-identified]  DATE OF SERVICE:  2019      PREOPERATIVE DIAGNOSES:  1.  Osteomyelitis, left foot. 2.  Cellulitis, left foot. 3.  Equinus, left lower extremity. POSTOPERATIVE DIAGNOSES:  1.  Osteomyelitis, left foot. 2.  Cellulitis, left foot. 3.  Equinus, left lower extremity. PROCEDURES PERFORMED:  1. Achilles tendon lengthening, left lower extremity. 2.  Transmetatarsal amputation, left lower extremity. 3.  Open bone biopsies, left lower extremity. SURGEON:  [place] Bobby DPM    ASSISTANT:  None. ANESTHESIA:  General with local.    COMPLICATIONS:  None. SPECIMENS REMOVED:  1. Permanent specimen, left forefoot. 2.  Bone biopsy permanent specimen, proximal margin, second metatarsal, left foot. 3.  Bone permanent specimen, proximal margin, third metatarsal, left foot. 4.  Bone culture, aerobe and anaerobe, proximal margin, second metatarsal, left foot. 5.  Bone culture, aerobe and anaerobe, proximal margin, third metatarsal, left foot. IMPLANTS:  none    ESTIMATED BLOOD LOSS:  Less than 100 mL. HEMOSTASIS:  Correct anatomic dissection as well as well-padded pneumatic calf tourniquet applied to the patient's left lower extremity. INJECTABLES:  20 mL of 0.5% Marcaine plain. MATERIALS USED:  None. FINDINGS:  Noted nonviable soft tissue and bone to the left forefoot. INDICATIONS FOR PROCEDURE:  The patient is a 78-year-old diabetic male. 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 He was seen in our office for the noted deformity as listed above. Despite conservative care and efforts, the pathology remains recalcitrant, and for this reason, the patient has elected to undergo surgical intervention at this time.   He was made aware of all risks and complications of the procedure and he acknowledged these risks by signing the consent form which was placed in the patient's chart. N.p.o. status was confirmed. The patient was medically cleared for the procedure. PROCEDURE IN DETAIL:  The patient was brought back to the operating room, placed in the supine position and placed under general anesthesia and following this, a well-padded pneumatic calf tourniquet was applied to the patient's left lower extremity. The patient was then scrubbed, prepped and draped using normal aseptic technique. Following this, attention was directed to the posterior aspect of the patient's left lower extremity at the level of the Achilles tendon and its insertion. The left lower extremity was elevated and placed in a dorsiflexed position. Following this, we then inserted a 15 blade centrally through the Achilles. This was rotated 90 degrees medial followed by 90 degrees superiorly releasing 50% of the Achilles tendon, being sure not to penetrate the skin border at approximately 2.5 cm proximal to its insertion. The blade was then returned to neutral and removed. Once completed, we then made a second stab incision central through the Achilles 2.5 cm proximal to the first incision, at this time rotated 90 degrees laterally followed by 90 degrees superiorly, again releasing 50% of the Achilles tendon, but being sure not to penetrate the skin border. Again, the 15 blade was returned to neutral and removed. A final third stab incision was made 2.5 cm proximal to the second incision centrally through the Achilles, again this was rotated 90 degrees medially followed by 90 degrees superiorly. The blade was again returned to neutral.  The left lower extremity was then placed on the operating table. We then directed our attention to the noted forefoot with previous amputations and significant nonviable soft tissue with large ulceration, plantar to the second metatarsophalangeal joint.   Utilizing a marking pen, we marked out a fishmouth type incision over the forefoot. This was then carried down to the level of bone utilizing a 15 blade. Following this, we then used a Hoffman elevator to free up the periosteum and soft tissue proximally followed by use of a sagittal saw to complete a transmetatarsal amputation re-creating a good metatarsal parabola. At this time, the forefoot was then disarticulated at the osteotomy site and removed from the surgical field. This was placed on the back table and sent for further pathologic examination. Once completed, we then lowered the tourniquet to evaluate hemostasis. Any bleeders were bovied or cauterized or tied in a surgeon's hand knot as necessary. Once hemostasis was gained, we then irrigated the wound bed with a pulse lavage utilizing 3 liters of normal saline in standard technique. At this time, we then utilized a clean rongeur to harvest open bone biopsies of the second and third proximal margin metatarsals. These were labeled accordingly and sent for further pathologic examination including permanent specimen as well as bone cultures. Following this, we then utilized a combination of 2-0 and 3-0 nylon to then close the incision in standard fashion utilizing simple interrupted and vertical mattress sutures. We evaluated the incision site after closure to be sure that it was pink with good capillary refill time and warm to touch. Once confirmed, we then applied a dry sterile dressing to the left lower extremity including Xeroform, 4 x 4's, multiple ABDs, Kerlix, and a light Ace wrap. The patient tolerated the anesthesia and procedure well and was taken to postanesthesia care unit where they will remain until they are stable enough to return home. They were given instructions regarding absolute nonweightbearing status to the left lower extremity. The patient currently already has DME including wheelchair at home and will be able to comply with the nonweightbearing status.   We will see the patient outpatient in the office for further evaluation and management of their postsurgical condition.         Le Meza DPM      [id number]  D:  08/14/2019 15:18  T:  08/14/2019 15:26  JOB #:  [id number]

## 2019-08-16 LAB
BACTERIA SPEC CULT: NORMAL
BACTERIA SPEC CULT: NORMAL
SERVICE CMNT-IMP: NORMAL
SERVICE CMNT-IMP: NORMAL

## 2019-08-17 LAB
BACTERIA SPEC CULT: ABNORMAL
GRAM STN SPEC: ABNORMAL
GRAM STN SPEC: ABNORMAL
SERVICE CMNT-IMP: ABNORMAL

## 2020-01-17 ENCOUNTER — HOSPITAL ENCOUNTER (EMERGENCY)
Age: 75
Discharge: HOME | End: 2020-01-17
Attending: EMERGENCY MEDICINE
Payer: MEDICARE

## 2020-01-17 ENCOUNTER — OFFICE VISIT (OUTPATIENT)
Dept: CARDIOLOGY CLINIC | Age: 75
End: 2020-01-17

## 2020-01-17 VITALS
DIASTOLIC BLOOD PRESSURE: 93 MMHG | SYSTOLIC BLOOD PRESSURE: 168 MMHG | TEMPERATURE: 98 F | HEART RATE: 88 BPM | OXYGEN SATURATION: 97 % | RESPIRATION RATE: 16 BRPM

## 2020-01-17 VITALS
RESPIRATION RATE: 14 BRPM | BODY MASS INDEX: 29.12 KG/M2 | WEIGHT: 208 LBS | HEIGHT: 71 IN | HEART RATE: 71 BPM | DIASTOLIC BLOOD PRESSURE: 102 MMHG | SYSTOLIC BLOOD PRESSURE: 194 MMHG

## 2020-01-17 DIAGNOSIS — I48.0 PAROXYSMAL ATRIAL FIBRILLATION (HCC): ICD-10-CM

## 2020-01-17 DIAGNOSIS — N32.0 BLADDER OUTLET OBSTRUCTION: Primary | ICD-10-CM

## 2020-01-17 DIAGNOSIS — R09.89 BRUIT: ICD-10-CM

## 2020-01-17 DIAGNOSIS — I50.33 ACUTE ON CHRONIC DIASTOLIC HEART FAILURE (HCC): ICD-10-CM

## 2020-01-17 DIAGNOSIS — I25.10 CORONARY ARTERY DISEASE INVOLVING NATIVE CORONARY ARTERY WITHOUT ANGINA PECTORIS, UNSPECIFIED WHETHER NATIVE OR TRANSPLANTED HEART: Primary | ICD-10-CM

## 2020-01-17 DIAGNOSIS — R33.8 ACUTE URINARY RETENTION: ICD-10-CM

## 2020-01-17 LAB
ALBUMIN SERPL-MCNC: 4 G/DL (ref 3.5–5)
ALBUMIN/GLOB SERPL: 0.9 {RATIO} (ref 1.1–2.2)
ALP SERPL-CCNC: 90 U/L (ref 45–117)
ALT SERPL-CCNC: 29 U/L (ref 12–78)
ANION GAP SERPL CALC-SCNC: 10 MMOL/L (ref 5–15)
APPEARANCE UR: CLEAR
AST SERPL-CCNC: 24 U/L (ref 15–37)
ATRIAL RATE: 83 BPM
BACTERIA URNS QL MICRO: NEGATIVE /HPF
BASOPHILS # BLD: 0.1 K/UL (ref 0–0.1)
BASOPHILS NFR BLD: 1 % (ref 0–1)
BILIRUB SERPL-MCNC: 0.7 MG/DL (ref 0.2–1)
BILIRUB UR QL: NEGATIVE
BUN SERPL-MCNC: 26 MG/DL (ref 6–20)
BUN/CREAT SERPL: 17 (ref 12–20)
CALCIUM SERPL-MCNC: 10 MG/DL (ref 8.5–10.1)
CALCULATED P AXIS, ECG09: 32 DEGREES
CALCULATED R AXIS, ECG10: -28 DEGREES
CALCULATED T AXIS, ECG11: 41 DEGREES
CHLORIDE SERPL-SCNC: 104 MMOL/L (ref 97–108)
CO2 SERPL-SCNC: 23 MMOL/L (ref 21–32)
COLOR UR: ABNORMAL
COMMENT, HOLDF: NORMAL
CREAT SERPL-MCNC: 1.5 MG/DL (ref 0.7–1.3)
DIAGNOSIS, 93000: NORMAL
DIFFERENTIAL METHOD BLD: ABNORMAL
EOSINOPHIL # BLD: 0.2 K/UL (ref 0–0.4)
EOSINOPHIL NFR BLD: 2 % (ref 0–7)
EPITH CASTS URNS QL MICRO: ABNORMAL /LPF
ERYTHROCYTE [DISTWIDTH] IN BLOOD BY AUTOMATED COUNT: 13.3 % (ref 11.5–14.5)
GLOBULIN SER CALC-MCNC: 4.5 G/DL (ref 2–4)
GLUCOSE SERPL-MCNC: 167 MG/DL (ref 65–100)
GLUCOSE UR STRIP.AUTO-MCNC: NEGATIVE MG/DL
HCT VFR BLD AUTO: 49.7 % (ref 36.6–50.3)
HGB BLD-MCNC: 17.1 G/DL (ref 12.1–17)
HGB UR QL STRIP: ABNORMAL
HYALINE CASTS URNS QL MICRO: ABNORMAL /LPF (ref 0–5)
IMM GRANULOCYTES # BLD AUTO: 0 K/UL (ref 0–0.04)
IMM GRANULOCYTES NFR BLD AUTO: 0 % (ref 0–0.5)
KETONES UR QL STRIP.AUTO: NEGATIVE MG/DL
LEUKOCYTE ESTERASE UR QL STRIP.AUTO: NEGATIVE
LYMPHOCYTES # BLD: 1.7 K/UL (ref 0.8–3.5)
LYMPHOCYTES NFR BLD: 16 % (ref 12–49)
MCH RBC QN AUTO: 30.6 PG (ref 26–34)
MCHC RBC AUTO-ENTMCNC: 34.4 G/DL (ref 30–36.5)
MCV RBC AUTO: 88.9 FL (ref 80–99)
MONOCYTES # BLD: 0.6 K/UL (ref 0–1)
MONOCYTES NFR BLD: 5 % (ref 5–13)
NEUTS SEG # BLD: 8.5 K/UL (ref 1.8–8)
NEUTS SEG NFR BLD: 76 % (ref 32–75)
NITRITE UR QL STRIP.AUTO: NEGATIVE
NRBC # BLD: 0 K/UL (ref 0–0.01)
NRBC BLD-RTO: 0 PER 100 WBC
P-R INTERVAL, ECG05: 162 MS
PH UR STRIP: 7.5 [PH] (ref 5–8)
PLATELET # BLD AUTO: 193 K/UL (ref 150–400)
PMV BLD AUTO: 9.9 FL (ref 8.9–12.9)
POTASSIUM SERPL-SCNC: 3.4 MMOL/L (ref 3.5–5.1)
PROT SERPL-MCNC: 8.5 G/DL (ref 6.4–8.2)
PROT UR STRIP-MCNC: 100 MG/DL
Q-T INTERVAL, ECG07: 412 MS
QRS DURATION, ECG06: 88 MS
QTC CALCULATION (BEZET), ECG08: 484 MS
RBC # BLD AUTO: 5.59 M/UL (ref 4.1–5.7)
RBC #/AREA URNS HPF: ABNORMAL /HPF (ref 0–5)
SAMPLES BEING HELD,HOLD: NORMAL
SODIUM SERPL-SCNC: 137 MMOL/L (ref 136–145)
SP GR UR REFRACTOMETRY: 1.01 (ref 1–1.03)
TROPONIN I SERPL-MCNC: <0.05 NG/ML
UR CULT HOLD, URHOLD: NORMAL
UROBILINOGEN UR QL STRIP.AUTO: 0.2 EU/DL (ref 0.2–1)
VENTRICULAR RATE, ECG03: 83 BPM
WBC # BLD AUTO: 11.1 K/UL (ref 4.1–11.1)
WBC URNS QL MICRO: ABNORMAL /HPF (ref 0–4)

## 2020-01-17 PROCEDURE — 51702 INSERT TEMP BLADDER CATH: CPT

## 2020-01-17 PROCEDURE — 99284 EMERGENCY DEPT VISIT MOD MDM: CPT

## 2020-01-17 PROCEDURE — 85025 COMPLETE CBC W/AUTO DIFF WBC: CPT

## 2020-01-17 PROCEDURE — 81001 URINALYSIS AUTO W/SCOPE: CPT

## 2020-01-17 PROCEDURE — 93005 ELECTROCARDIOGRAM TRACING: CPT

## 2020-01-17 PROCEDURE — 84484 ASSAY OF TROPONIN QUANT: CPT

## 2020-01-17 PROCEDURE — 36415 COLL VENOUS BLD VENIPUNCTURE: CPT

## 2020-01-17 PROCEDURE — 74011250636 HC RX REV CODE- 250/636: Performed by: EMERGENCY MEDICINE

## 2020-01-17 PROCEDURE — 80053 COMPREHEN METABOLIC PANEL: CPT

## 2020-01-17 RX ORDER — TAMSULOSIN HYDROCHLORIDE 0.4 MG/1
0.4 CAPSULE ORAL DAILY
Qty: 15 CAP | Refills: 0 | Status: SHIPPED | OUTPATIENT
Start: 2020-01-17 | End: 2020-02-01

## 2020-01-17 RX ORDER — POTASSIUM CHLORIDE 750 MG/1
10 CAPSULE, EXTENDED RELEASE ORAL 2 TIMES DAILY
COMMUNITY
Start: 2019-11-21

## 2020-01-17 RX ORDER — LISINOPRIL 20 MG/1
20 TABLET ORAL 2 TIMES DAILY
Qty: 180 TAB | Refills: 1 | Status: SHIPPED | OUTPATIENT
Start: 2020-01-17 | End: 2020-07-15

## 2020-01-17 RX ADMIN — SODIUM CHLORIDE 1000 ML: 900 INJECTION, SOLUTION INTRAVENOUS at 14:43

## 2020-01-17 NOTE — PROGRESS NOTES
Visit Vitals  BP (!) 222/112 (BP 1 Location: Left arm, BP Patient Position: Sitting)   Pulse 71   Resp 14   Ht 5' 11\" (1.803 m)   Wt 208 lb (94.3 kg)   BMI 29.01 kg/m²

## 2020-01-17 NOTE — ED TRIAGE NOTES
Pt arrives via personal vehicle for c/o hypertension. Pt was seen by Farhat Javed today and was referred to come to ER for HTN - 222/112 at office. 230/132 in triage. Pt reports drinking 4 cups of 16oz coffee this morning and being unable to urinate. Denies CP, SOB.
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
## 2020-01-29 ENCOUNTER — CLINICAL SUPPORT (OUTPATIENT)
Dept: CARDIOLOGY CLINIC | Age: 75
End: 2020-01-29

## 2020-01-29 DIAGNOSIS — I25.10 CORONARY ARTERY DISEASE INVOLVING NATIVE CORONARY ARTERY WITHOUT ANGINA PECTORIS, UNSPECIFIED WHETHER NATIVE OR TRANSPLANTED HEART: Primary | ICD-10-CM

## 2020-01-29 LAB
ECHO AO ROOT DIAM: 4.09 CM
ECHO LA MAJOR AXIS: 5.37 CM
ECHO LA TO AORTIC ROOT RATIO: 1.31
ECHO LV EDV A2C: 180.9 ML
ECHO LV EDV A4C: 178.8 ML
ECHO LV EDV BP: 180 ML (ref 67–155)
ECHO LV EDV INDEX A4C: 83.4 ML/M2
ECHO LV EDV INDEX BP: 83.9 ML/M2
ECHO LV EDV NDEX A2C: 84.4 ML/M2
ECHO LV EDV TEICHHOLZ: 0.64 ML
ECHO LV EJECTION FRACTION A2C: 42 %
ECHO LV EJECTION FRACTION A4C: 44 %
ECHO LV EJECTION FRACTION BIPLANE: 35.5 % (ref 55–100)
ECHO LV ESV A2C: 104.7 ML
ECHO LV ESV A4C: 100.2 ML
ECHO LV ESV BP: 116.1 ML (ref 22–58)
ECHO LV ESV INDEX A2C: 48.8 ML/M2
ECHO LV ESV INDEX A4C: 46.7 ML/M2
ECHO LV ESV INDEX BP: 54.1 ML/M2
ECHO LV ESV TEICHHOLZ: 0.33 ML
ECHO LV GLOBAL LONGITUDINAL STRAIN (GLS): -13.7 %
ECHO LV INTERNAL DIMENSION DIASTOLIC: 4.96 CM (ref 4.2–5.9)
ECHO LV INTERNAL DIMENSION SYSTOLIC: 3.76 CM
ECHO LV IVSD: 1.82 CM (ref 0.6–1)
ECHO LV MASS 2D: 473.4 G (ref 88–224)
ECHO LV MASS INDEX 2D: 220.8 G/M2 (ref 49–115)
ECHO LV POSTERIOR WALL DIASTOLIC: 1.61 CM (ref 0.6–1)
LVFS 2D: 24.24 %
LVSV (MOD BI): 29.39 ML
LVSV (MOD SINGLE 4C): 36.16 ML
LVSV (MOD SINGLE): 35.06 ML
LVSV (TEICH): 25.63 ML

## 2020-01-29 RX ORDER — AMLODIPINE BESYLATE 5 MG/1
5 TABLET ORAL DAILY
Qty: 30 TAB | Refills: 5 | Status: SHIPPED | OUTPATIENT
Start: 2020-01-29 | End: 2020-02-21

## 2020-01-29 NOTE — PROGRESS NOTES
Patient in for BP check and vascular study this AM. Patient sitting with feet flat on the ground. /88. Verified all medications. Patient states that he is feeling much better than last week. Patient denies any headache or blurred vision. Patient states that yesterday evening around 8:00 pm his BP was 128/74, HR 62. Will notify MD for medication changes.

## 2020-01-29 NOTE — PROGRESS NOTES
VO per Dr. Vasu Jin patient to start Norvasc 5 mg daily. Verified patient with two types of identifiers. Notified patient of MD recommendations. Pharmacy verified. Scheduled patient for 1 week blood pressure check. Patient verbalized understanding and will call with any other questions.       Future Appointments   Date Time Provider Yani Wilhelm   2/5/2020 11:00 AM Randy Millan  [address]   2/21/2020 10:40 AM Randy Millan  [address]

## 2020-02-12 ENCOUNTER — TELEPHONE (OUTPATIENT)
Dept: CARDIOLOGY CLINIC | Age: 75
End: 2020-02-12

## 2020-02-12 NOTE — TELEPHONE ENCOUNTER
Received fax request for cardiac clearance for patient from [address] [facility] for prostate biopsy under general. They are also requesting patient hold his baby ASA prior.

## 2020-02-21 ENCOUNTER — OFFICE VISIT (OUTPATIENT)
Dept: CARDIOLOGY CLINIC | Age: 75
End: 2020-02-21

## 2020-02-21 VITALS
OXYGEN SATURATION: 98 % | DIASTOLIC BLOOD PRESSURE: 86 MMHG | HEART RATE: 60 BPM | RESPIRATION RATE: 19 BRPM | SYSTOLIC BLOOD PRESSURE: 158 MMHG | WEIGHT: 203 LBS | BODY MASS INDEX: 28.42 KG/M2 | HEIGHT: 71 IN

## 2020-02-21 DIAGNOSIS — I65.23 BILATERAL CAROTID ARTERY STENOSIS: ICD-10-CM

## 2020-02-21 DIAGNOSIS — I48.0 PAROXYSMAL ATRIAL FIBRILLATION (HCC): ICD-10-CM

## 2020-02-21 DIAGNOSIS — I25.10 CORONARY ARTERY DISEASE INVOLVING NATIVE CORONARY ARTERY WITHOUT ANGINA PECTORIS, UNSPECIFIED WHETHER NATIVE OR TRANSPLANTED HEART: Primary | ICD-10-CM

## 2020-02-21 RX ORDER — AMLODIPINE BESYLATE 10 MG/1
10 TABLET ORAL DAILY
Qty: 90 TAB | Refills: 1 | Status: SHIPPED | OUTPATIENT
Start: 2020-02-21 | End: 2020-02-21 | Stop reason: SDUPTHER

## 2020-02-21 RX ORDER — AMLODIPINE BESYLATE 10 MG/1
10 TABLET ORAL DAILY
Qty: 30 TAB | Refills: 0 | Status: SHIPPED | OUTPATIENT
Start: 2020-02-21 | End: 2020-08-27

## 2020-02-21 RX ORDER — TAMSULOSIN HYDROCHLORIDE 0.4 MG/1
0.4 CAPSULE ORAL DAILY
COMMUNITY

## 2020-02-21 NOTE — PROGRESS NOTES
Visit Vitals  /90 (BP 1 Location: Left arm, BP Patient Position: Sitting)   Pulse 60   Resp 19   Ht 5' 11\" (1.803 m)   Wt 203 lb (92.1 kg)   SpO2 98%   BMI 28.31 kg/m²

## 2020-02-21 NOTE — PATIENT INSTRUCTIONS
Increase norvasc to 10 mg daily. Call our office at [phone] in 1 week with BP readings. Record blood pressure 3 times/week. Remember to sit for at least 3 minutes. Have both feet flat on floor and arm at heart level. Follow up with Dr. Sruthi Vega in 6 months.
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
## 2020-03-02 ENCOUNTER — TELEPHONE (OUTPATIENT)
Dept: CARDIOLOGY CLINIC | Age: 75
End: 2020-03-02

## 2020-03-02 NOTE — TELEPHONE ENCOUNTER
Identifiers x 2. Verbalized the following readings:    2-22-20   am 138/80; 73  pm 142/79; 73  2-23-20 am 123/76; 60  2-24-20 am 168/93; 68  pm 142/82; 69  2-25-20 am 149/80; 60  pm 122/71; 75  2-26-20 am 136/83; 74  pm 136/75; 68  2-27-20   am 150/87; 73  pm 154/82; 76  2-28-20 am 147/82; 73  pm 140/86; 69    Confirmed follow up. Future Appointments   Date Time Provider Yani Wilhelm   8/21/2020 10:40 AM Shayla Joaquin  [address]     Confirmed that he is taking norvasc 10 mg daily, lisinopril 20 mg bid, HCTZ 25 mg daily; lopressor 50 mg bid.

## 2020-03-02 NOTE — TELEPHONE ENCOUNTER
Patient is calling to give blood pressure readings from last week as requested.      Phone: [phone]

## 2020-03-05 RX ORDER — METOPROLOL TARTRATE 50 MG/1
75 TABLET ORAL 2 TIMES DAILY
Qty: 270 TAB | Refills: 1 | Status: SHIPPED | OUTPATIENT
Start: 2020-03-05

## 2020-03-05 NOTE — TELEPHONE ENCOUNTER
Per Dr. Anderson Nurse:    Increase lopressor to 75 mg bid   Continue bp log      Identifiers x 2. Informed of the above. Verbalized understanding. Requested Prescriptions     Signed Prescriptions Disp Refills    metoprolol tartrate (LOPRESSOR) 50 mg tablet 270 Tab 1     Sig: Take 1.5 Tabs by mouth two (2) times a day. Authorizing Provider: Alena Jacobo     Ordering User: Serina Epps     Verbal order per Dr. Anderson Nurse.

## 2020-03-19 ENCOUNTER — TELEPHONE (OUTPATIENT)
Dept: CARDIOLOGY CLINIC | Age: 75
End: 2020-03-19

## 2020-03-19 NOTE — TELEPHONE ENCOUNTER
Patient is calling to give a log of his Bp readings.  Patient thinks that his Bp was off do to be worried and stressed over his PSA test and that they redid it and it came back much better and since his Bp readings have been as follows    121/73  Pulse 75  126-77  Pulse 68  118/64  Pulse 64  125/69  Pulse 64    Please advise    Phone: [phone]

## 2020-03-20 NOTE — TELEPHONE ENCOUNTER
Per Dr. Miya Yates:    Bipin Carrera cont rx     Identifiers x 2. Informed of the above. Verbalized understanding. Confirmed follow up.      Future Appointments   Date Time Provider Yani Wilhelm   8/21/2020 10:40 AM Wallis Epley,  [address]

## 2020-07-15 RX ORDER — LISINOPRIL 20 MG/1
TABLET ORAL
Qty: 180 TAB | Refills: 3 | Status: SHIPPED | OUTPATIENT
Start: 2020-07-15 | End: 2021-07-12

## 2020-08-27 RX ORDER — AMLODIPINE BESYLATE 10 MG/1
TABLET ORAL
Qty: 90 TAB | Refills: 0 | Status: SHIPPED | OUTPATIENT
Start: 2020-08-27 | End: 2020-11-25

## 2020-08-27 NOTE — TELEPHONE ENCOUNTER
Requested Prescriptions     Signed Prescriptions Disp Refills    amLODIPine (NORVASC) 10 mg tablet 90 Tab 0     Sig: TAKE 1 TABLET DAILY (NOTE THE DOSE INCREASED)     Authorizing Provider: Doc Villar     Ordering User: Javy Augustine     Verbal order per Dr. David Millan.     Future Appointments   Date Time Provider Yani Wilhelm   10/22/2020 10:20 AM MD DEJA Ashby AMB

## 2020-10-22 ENCOUNTER — OFFICE VISIT (OUTPATIENT)
Dept: CARDIOLOGY CLINIC | Age: 75
End: 2020-10-22
Payer: MEDICARE

## 2020-10-22 VITALS
HEART RATE: 70 BPM | SYSTOLIC BLOOD PRESSURE: 130 MMHG | WEIGHT: 207 LBS | HEIGHT: 71 IN | DIASTOLIC BLOOD PRESSURE: 80 MMHG | RESPIRATION RATE: 18 BRPM | OXYGEN SATURATION: 96 % | BODY MASS INDEX: 28.98 KG/M2

## 2020-10-22 DIAGNOSIS — I25.10 CORONARY ARTERY DISEASE INVOLVING NATIVE CORONARY ARTERY WITHOUT ANGINA PECTORIS, UNSPECIFIED WHETHER NATIVE OR TRANSPLANTED HEART: Primary | ICD-10-CM

## 2020-10-22 DIAGNOSIS — I65.23 BILATERAL CAROTID ARTERY STENOSIS: ICD-10-CM

## 2020-10-22 DIAGNOSIS — I50.33 ACUTE ON CHRONIC DIASTOLIC HEART FAILURE (HCC): ICD-10-CM

## 2020-10-22 PROCEDURE — G8432 DEP SCR NOT DOC, RNG: HCPCS | Performed by: SPECIALIST

## 2020-10-22 PROCEDURE — 99214 OFFICE O/P EST MOD 30 MIN: CPT | Performed by: SPECIALIST

## 2020-10-22 PROCEDURE — 1101F PT FALLS ASSESS-DOCD LE1/YR: CPT | Performed by: SPECIALIST

## 2020-10-22 PROCEDURE — G8752 SYS BP LESS 140: HCPCS | Performed by: SPECIALIST

## 2020-10-22 PROCEDURE — G8419 CALC BMI OUT NRM PARAM NOF/U: HCPCS | Performed by: SPECIALIST

## 2020-10-22 PROCEDURE — 93000 ELECTROCARDIOGRAM COMPLETE: CPT | Performed by: SPECIALIST

## 2020-10-22 PROCEDURE — G8427 DOCREV CUR MEDS BY ELIG CLIN: HCPCS | Performed by: SPECIALIST

## 2020-10-22 PROCEDURE — G8536 NO DOC ELDER MAL SCRN: HCPCS | Performed by: SPECIALIST

## 2020-10-22 PROCEDURE — G8754 DIAS BP LESS 90: HCPCS | Performed by: SPECIALIST

## 2020-10-22 RX ORDER — GLUCOSAMINE/CHONDR SU A SOD 167-133 MG
500 CAPSULE ORAL
COMMUNITY

## 2020-10-22 RX ORDER — PERPHENAZINE 16 MG
300 TABLET ORAL DAILY
COMMUNITY
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
## 2020-10-22 NOTE — PROGRESS NOTES
Visit Vitals  /80 (BP 1 Location: Left arm, BP Patient Position: Sitting)   Pulse 70   Resp 18   Ht 5' 11\" (1.803 m)   Wt 207 lb (93.9 kg)   SpO2 96%   BMI 28.87 kg/m²

## 2020-11-25 RX ORDER — AMLODIPINE BESYLATE 10 MG/1
10 TABLET ORAL DAILY
Qty: 90 TAB | Refills: 1 | Status: SHIPPED | OUTPATIENT
Start: 2020-11-25 | End: 2021-04-22

## 2020-11-25 NOTE — TELEPHONE ENCOUNTER
Requested Prescriptions     Signed Prescriptions Disp Refills    amLODIPine (NORVASC) 10 mg tablet 90 Tab 1     Sig: Take 1 Tab by mouth daily. Authorizing Provider: Ry Dumont     Ordering User: Nessa Castillo     Verbal order per Dr. Kinza Ball.     Future Appointments   Date Time Provider Yani Wilhelm   4/15/2021 10:00 AM ELINOR MALLORY AMB   4/15/2021 11:00 AM ELINOR HILLIARD AMB   4/22/2021 10:20 AM Lexa Covarrubias MD [facility] AMB

## 2021-04-15 ENCOUNTER — ANCILLARY PROCEDURE (OUTPATIENT)
Dept: CARDIOLOGY CLINIC | Age: 76
End: 2021-04-15

## 2021-04-15 ENCOUNTER — ANCILLARY PROCEDURE (OUTPATIENT)
Dept: CARDIOLOGY CLINIC | Age: 76
End: 2021-04-15
Payer: MEDICARE

## 2021-04-15 VITALS
WEIGHT: 207 LBS | SYSTOLIC BLOOD PRESSURE: 130 MMHG | HEIGHT: 71 IN | BODY MASS INDEX: 28.98 KG/M2 | DIASTOLIC BLOOD PRESSURE: 80 MMHG

## 2021-04-15 DIAGNOSIS — I65.23 BILATERAL CAROTID ARTERY STENOSIS: ICD-10-CM

## 2021-04-15 DIAGNOSIS — I50.33 ACUTE ON CHRONIC DIASTOLIC HEART FAILURE (HCC): ICD-10-CM

## 2021-04-15 LAB
ECHO AO ASC DIAM: 3.71 CM
ECHO AO ROOT DIAM: 3.18 CM
ECHO AV AREA PEAK VELOCITY: 2.3 CM2
ECHO AV AREA VTI: 2.56 CM2
ECHO AV AREA/BSA PEAK VELOCITY: 1.1 CM2/M2
ECHO AV AREA/BSA VTI: 1.2 CM2/M2
ECHO AV MEAN GRADIENT: 2.24 MMHG
ECHO AV PEAK GRADIENT: 4.79 MMHG
ECHO AV PEAK VELOCITY: 109.46 CM/S
ECHO AV VTI: 17.45 CM
ECHO EST RA PRESSURE: 3 MMHG
ECHO IVC PROX: 2.01 CM
ECHO LA AREA 4C: 27.41 CM2
ECHO LA MAJOR AXIS: 4.81 CM
ECHO LA MINOR AXIS: 2.25 CM
ECHO LA VOL 2C: 88.32 ML (ref 18–58)
ECHO LA VOL 4C: 89.49 ML (ref 18–58)
ECHO LA VOL BP: 93.02 ML (ref 18–58)
ECHO LA VOL/BSA BIPLANE: 43.47 ML/M2 (ref 16–28)
ECHO LA VOLUME INDEX A2C: 41.27 ML/M2 (ref 16–28)
ECHO LA VOLUME INDEX A4C: 41.82 ML/M2 (ref 16–28)
ECHO LV EDV A2C: 174 ML
ECHO LV EDV A4C: 166.57 ML
ECHO LV EDV BP: 178.93 ML (ref 67–155)
ECHO LV EDV INDEX A4C: 77.8 ML/M2
ECHO LV EDV INDEX BP: 83.6 ML/M2
ECHO LV EDV NDEX A2C: 81.3 ML/M2
ECHO LV EJECTION FRACTION A2C: 57 PERCENT
ECHO LV EJECTION FRACTION A4C: 44 PERCENT
ECHO LV EJECTION FRACTION BIPLANE: 53.9 PERCENT (ref 55–100)
ECHO LV ESV A2C: 74.02 ML
ECHO LV ESV A4C: 93.06 ML
ECHO LV ESV BP: 82.5 ML (ref 22–58)
ECHO LV ESV INDEX A2C: 34.6 ML/M2
ECHO LV ESV INDEX A4C: 43.5 ML/M2
ECHO LV ESV INDEX BP: 38.6 ML/M2
ECHO LV INTERNAL DIMENSION DIASTOLIC: 4.89 CM (ref 4.2–5.9)
ECHO LV INTERNAL DIMENSION SYSTOLIC: 4.21 CM
ECHO LV IVSD: 1.36 CM (ref 0.6–1)
ECHO LV MASS 2D: 189.9 G (ref 88–224)
ECHO LV MASS INDEX 2D: 88.7 G/M2 (ref 49–115)
ECHO LV POSTERIOR WALL DIASTOLIC: 0.76 CM (ref 0.6–1)
ECHO LVOT DIAM: 2.13 CM
ECHO LVOT PEAK GRADIENT: 2 MMHG
ECHO LVOT PEAK VELOCITY: 70.7 CM/S
ECHO LVOT SV: 44.6 ML
ECHO LVOT VTI: 12.51 CM
ECHO RA MAJOR AXIS: 4.91 CM
ECHO RIGHT VENTRICULAR SYSTOLIC PRESSURE (RVSP): 42 MMHG
ECHO RV INTERNAL DIMENSION: 4.12 CM
ECHO TV REGURGITANT MAX VELOCITY: 311.45 CM/S
ECHO TV REGURGITANT PEAK GRADIENT: 38.81 MMHG
LA VOL DISK BP: 89.9 ML (ref 18–58)
LEFT CCA DIST DIAS: 9.2 CENTIMETER/SECOND
LEFT CCA DIST SYS: 40.5 CENTIMETER/SECOND
LEFT CCA PROX DIAS: 8.5 CENTIMETER/SECOND
LEFT CCA PROX SYS: 59.6 CENTIMETER/SECOND
LEFT ECA DIAS: 3.61 CENTIMETER/SECOND
LEFT ECA SYS: 39.6 CENTIMETER/SECOND
LEFT ICA DIST DIAS: 15.5 CENTIMETER/SECOND
LEFT ICA DIST SYS: 50.9 CENTIMETER/SECOND
LEFT ICA MID DIAS: 14.2 CENTIMETER/SECOND
LEFT ICA MID SYS: 43 CENTIMETER/SECOND
LEFT ICA PROX DIAS: 6.8 CENTIMETER/SECOND
LEFT ICA PROX SYS: 26.5 CENTIMETER/SECOND
LEFT ICA/CCA SYS: 0.86
LEFT VERTEBRAL DIAS: 11.02 CENTIMETER/SECOND
LEFT VERTEBRAL SYS: 37.5 CENTIMETER/SECOND
LVOT MG: 0.92 MMHG
RIGHT CCA DIST DIAS: 9.3 CENTIMETER/SECOND
RIGHT CCA DIST SYS: 39.6 CENTIMETER/SECOND
RIGHT CCA PROX DIAS: 12 CENTIMETER/SECOND
RIGHT CCA PROX SYS: 49.4 CENTIMETER/SECOND
RIGHT ECA DIAS: 7.11 CENTIMETER/SECOND
RIGHT ECA SYS: 57.9 CENTIMETER/SECOND
RIGHT ICA DIST DIAS: 14.8 CENTIMETER/SECOND
RIGHT ICA DIST SYS: 39.8 CENTIMETER/SECOND
RIGHT ICA MID DIAS: 13.2 CENTIMETER/SECOND
RIGHT ICA MID SYS: 36 CENTIMETER/SECOND
RIGHT ICA PROX DIAS: 10.8 CENTIMETER/SECOND
RIGHT ICA PROX SYS: 38.4 CENTIMETER/SECOND
RIGHT ICA/CCA SYS: 0.8
RIGHT VERTEBRAL SYS: 52.1 CENTIMETER/SECOND

## 2021-04-15 PROCEDURE — 93880 EXTRACRANIAL BILAT STUDY: CPT | Performed by: SPECIALIST

## 2021-04-15 PROCEDURE — 93306 TTE W/DOPPLER COMPLETE: CPT | Performed by: SPECIALIST

## 2021-04-22 ENCOUNTER — OFFICE VISIT (OUTPATIENT)
Dept: CARDIOLOGY CLINIC | Age: 76
End: 2021-04-22
Payer: MEDICARE

## 2021-04-22 VITALS
SYSTOLIC BLOOD PRESSURE: 160 MMHG | HEART RATE: 63 BPM | DIASTOLIC BLOOD PRESSURE: 100 MMHG | HEIGHT: 71 IN | WEIGHT: 206 LBS | RESPIRATION RATE: 18 BRPM | BODY MASS INDEX: 28.84 KG/M2 | OXYGEN SATURATION: 97 %

## 2021-04-22 DIAGNOSIS — I25.10 CORONARY ARTERY DISEASE INVOLVING NATIVE CORONARY ARTERY OF NATIVE HEART WITHOUT ANGINA PECTORIS: Primary | ICD-10-CM

## 2021-04-22 DIAGNOSIS — I48.0 PAROXYSMAL ATRIAL FIBRILLATION (HCC): ICD-10-CM

## 2021-04-22 PROCEDURE — G8755 DIAS BP > OR = 90: HCPCS | Performed by: SPECIALIST

## 2021-04-22 PROCEDURE — G8510 SCR DEP NEG, NO PLAN REQD: HCPCS | Performed by: SPECIALIST

## 2021-04-22 PROCEDURE — G8427 DOCREV CUR MEDS BY ELIG CLIN: HCPCS | Performed by: SPECIALIST

## 2021-04-22 PROCEDURE — 1101F PT FALLS ASSESS-DOCD LE1/YR: CPT | Performed by: SPECIALIST

## 2021-04-22 PROCEDURE — G8536 NO DOC ELDER MAL SCRN: HCPCS | Performed by: SPECIALIST

## 2021-04-22 PROCEDURE — 93000 ELECTROCARDIOGRAM COMPLETE: CPT | Performed by: SPECIALIST

## 2021-04-22 PROCEDURE — G8419 CALC BMI OUT NRM PARAM NOF/U: HCPCS | Performed by: SPECIALIST

## 2021-04-22 PROCEDURE — 99214 OFFICE O/P EST MOD 30 MIN: CPT | Performed by: SPECIALIST

## 2021-04-22 PROCEDURE — G8753 SYS BP > OR = 140: HCPCS | Performed by: SPECIALIST

## 2021-04-22 RX ORDER — HYDRALAZINE HYDROCHLORIDE 50 MG/1
50 TABLET, FILM COATED ORAL 2 TIMES DAILY
Qty: 180 TAB | Refills: 1 | Status: SHIPPED | OUTPATIENT
Start: 2021-04-22 | End: 2021-04-23 | Stop reason: SDUPTHER

## 2021-04-22 RX ORDER — PEN NEEDLE, DIABETIC 32GX 5/32"
NEEDLE, DISPOSABLE MISCELLANEOUS
COMMUNITY
Start: 2021-02-08
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
## 2021-04-22 NOTE — PROGRESS NOTES
.  Visit Vitals  BP (!) 160/100 (BP 1 Location: Right arm, BP Patient Position: Sitting, BP Cuff Size: Adult)   Pulse 63   Resp 18   Ht 5' 11\" (1.803 m)   Wt 206 lb (93.4 kg)   SpO2 97%   BMI 28.73 kg/m²

## 2021-04-22 NOTE — PATIENT INSTRUCTIONS
Please START taking Eliquis 5mg twice a day. Please START taking Hydralazine 50mg twice a day. Follow up with Dr. Du Kirk in about 4 weeks.

## 2021-04-23 ENCOUNTER — TELEPHONE (OUTPATIENT)
Dept: CARDIOLOGY CLINIC | Age: 76
End: 2021-04-23

## 2021-04-23 DIAGNOSIS — I48.0 PAROXYSMAL ATRIAL FIBRILLATION (HCC): ICD-10-CM

## 2021-04-23 DIAGNOSIS — I25.10 CORONARY ARTERY DISEASE INVOLVING NATIVE CORONARY ARTERY OF NATIVE HEART WITHOUT ANGINA PECTORIS: ICD-10-CM

## 2021-04-23 RX ORDER — HYDRALAZINE HYDROCHLORIDE 50 MG/1
50 TABLET, FILM COATED ORAL 2 TIMES DAILY
Qty: 180 TAB | Refills: 1 | Status: SHIPPED | OUTPATIENT
Start: 2021-04-23 | End: 2021-08-03 | Stop reason: SDUPTHER

## 2021-04-23 NOTE — TELEPHONE ENCOUNTER
TC from pt, ID verified. Pt states he would like his medications sent to [facility]. He would also like to know if he should continue aspirin, or if he should stop once starting eliquis. Will consult MD and get back with him.

## 2021-04-23 NOTE — TELEPHONE ENCOUNTER
Patient states he has a few questions in regards to after visit summary from yesterday's appointment.     Phone: [phone]

## 2021-04-26 RX ORDER — ASPIRIN 81 MG/1
81 TABLET ORAL EVERY OTHER DAY
Qty: 45 TAB | Refills: 0
Start: 2021-04-26

## 2021-04-26 NOTE — TELEPHONE ENCOUNTER
Vicenta MD Tonia  You 3 days ago     Change aspirin to 81 mg every other day    Message text      TC to pt, ID verified. Advised per Dr. Walter Cowden to change aspirin 81mg to every other day. Pt understands and is picking up other prescriptions today. No further questions.

## 2021-07-12 RX ORDER — LISINOPRIL 20 MG/1
TABLET ORAL
Qty: 180 TABLET | Refills: 1 | Status: SHIPPED | OUTPATIENT
Start: 2021-07-12 | End: 2022-01-10

## 2021-07-12 NOTE — TELEPHONE ENCOUNTER
Requested Prescriptions     Signed Prescriptions Disp Refills    lisinopriL (PRINIVIL, ZESTRIL) 20 mg tablet 180 Tablet 1     Sig: TAKE 1 TABLET TWICE A DAY     Authorizing Provider: Olya Orozco     Ordering User: Gayatri Jerry     Verbal order per Dr. Mel Pineda.    Future Appointments   Date Time Provider Yani Wilhelm   7/22/2021 10:20 AM MD DEJA Lea AMB

## 2021-08-03 ENCOUNTER — OFFICE VISIT (OUTPATIENT)
Dept: CARDIOLOGY CLINIC | Age: 76
End: 2021-08-03
Payer: MEDICARE

## 2021-08-03 VITALS
BODY MASS INDEX: 28.28 KG/M2 | HEART RATE: 69 BPM | OXYGEN SATURATION: 96 % | DIASTOLIC BLOOD PRESSURE: 120 MMHG | WEIGHT: 202 LBS | RESPIRATION RATE: 16 BRPM | SYSTOLIC BLOOD PRESSURE: 180 MMHG | HEIGHT: 71 IN

## 2021-08-03 DIAGNOSIS — I48.0 PAROXYSMAL ATRIAL FIBRILLATION (HCC): Primary | ICD-10-CM

## 2021-08-03 PROCEDURE — G8419 CALC BMI OUT NRM PARAM NOF/U: HCPCS | Performed by: SPECIALIST

## 2021-08-03 PROCEDURE — G8753 SYS BP > OR = 140: HCPCS | Performed by: SPECIALIST

## 2021-08-03 PROCEDURE — G8536 NO DOC ELDER MAL SCRN: HCPCS | Performed by: SPECIALIST

## 2021-08-03 PROCEDURE — 1101F PT FALLS ASSESS-DOCD LE1/YR: CPT | Performed by: SPECIALIST

## 2021-08-03 PROCEDURE — G8510 SCR DEP NEG, NO PLAN REQD: HCPCS | Performed by: SPECIALIST

## 2021-08-03 PROCEDURE — 99214 OFFICE O/P EST MOD 30 MIN: CPT | Performed by: SPECIALIST

## 2021-08-03 PROCEDURE — 93000 ELECTROCARDIOGRAM COMPLETE: CPT | Performed by: SPECIALIST

## 2021-08-03 PROCEDURE — G8755 DIAS BP > OR = 90: HCPCS | Performed by: SPECIALIST

## 2021-08-03 PROCEDURE — G8427 DOCREV CUR MEDS BY ELIG CLIN: HCPCS | Performed by: SPECIALIST

## 2021-08-03 RX ORDER — DUTASTERIDE 0.5 MG/1
CAPSULE, LIQUID FILLED ORAL
COMMUNITY
Start: 2021-07-20

## 2021-08-03 RX ORDER — CEFUROXIME AXETIL 250 MG/1
TABLET ORAL
COMMUNITY
Start: 2021-08-02

## 2021-08-03 RX ORDER — HYDRALAZINE HYDROCHLORIDE 50 MG/1
50 TABLET, FILM COATED ORAL 2 TIMES DAILY
Qty: 180 TABLET | Refills: 1 | Status: SHIPPED | OUTPATIENT
Start: 2021-08-03 | End: 2022-01-10
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
## 2021-08-03 NOTE — PATIENT INSTRUCTIONS
Please START Hydralazine 50mg twice a day. Please begin a blood pressure log. Include the date, time, your blood pressure and heart rate. Please take readings at different times each day. Keep this log for 2 week(s) and then report it to us. Remember to sit for at least 3 minutes. Have both feet flat on floor and arm at heart level when taking your blood pressure.     Follow up with Dr. Elfego Meléndez in 4 months

## 2021-08-03 NOTE — PROGRESS NOTES
Visit Vitals  BP (!) 180/120   Pulse 69   Resp 16   Ht 5' 11\" (1.803 m)   Wt 202 lb (91.6 kg)   SpO2 96%   BMI 28.17 kg/m²

## 2021-08-18 ENCOUNTER — TRANSCRIBE ORDER (OUTPATIENT)
Dept: SCHEDULING | Age: 76
End: 2021-08-18

## 2021-08-18 DIAGNOSIS — N13.8 ENLARGED PROSTATE WITH URINARY OBSTRUCTION: ICD-10-CM

## 2021-08-18 DIAGNOSIS — R33.9 URINE RETENTION: Primary | ICD-10-CM

## 2021-08-18 DIAGNOSIS — N40.1 ENLARGED PROSTATE WITH URINARY OBSTRUCTION: ICD-10-CM

## 2021-08-31 ENCOUNTER — HOSPITAL ENCOUNTER (OUTPATIENT)
Dept: CT IMAGING | Age: 76
Discharge: HOME | End: 2021-08-31
Attending: UROLOGY
Payer: MEDICARE

## 2021-08-31 DIAGNOSIS — N13.8 ENLARGED PROSTATE WITH URINARY OBSTRUCTION: ICD-10-CM

## 2021-08-31 DIAGNOSIS — N40.1 ENLARGED PROSTATE WITH URINARY OBSTRUCTION: ICD-10-CM

## 2021-08-31 DIAGNOSIS — R33.9 URINE RETENTION: ICD-10-CM

## 2021-08-31 PROCEDURE — 74176 CT ABD & PELVIS W/O CONTRAST: CPT

## 2021-09-10 ENCOUNTER — TELEPHONE (OUTPATIENT)
Dept: CARDIOLOGY CLINIC | Age: 76
End: 2021-09-10

## 2021-09-10 NOTE — TELEPHONE ENCOUNTER
Patient called to give his Blood pressure readings. AM READINGS  /92 8/6/21  /84 8/7/21  /82 8/8/21  /89 8/9/21  /83 8/10/21  /87 8/11/21  /86 8/12/21  /83 8/13/21  /77 8/14/21  BP NO READING  8/15  /80 8/16/21  /79 8/17/21  /70 8/18/21  /83 8/19/21  /59 8/20/21  PM READINGS  /79 8/4/21  /79 8/5/21  /69 8/7/21  /67 8/8/21  /76 8/9/21  NO READING 8/10/21  /85 8/11/21  /82 8/12/21  /74 8/13/21  NO READING 8/14/21  /81 8/15/21  /79 8/16/21  /76 8/17/21  /96 8/18/21  /80 8/19/21    Patient also stated that he went to see his Urologist and his blood pressure was [email or web address] and when went home and had taken it,it was 139/70 @10:50am. Patient also wanted the provider know that he took the medicine hydralazine in 4 day increments starting at 25 mg ,then 50 mg, then 75 mg then 100 mg total.  Please give a call back.   Phone [phone]

## 2021-09-13 NOTE — TELEPHONE ENCOUNTER
Stephen Mcdonnell MD  You 3 days ago   MG  Too high BP increase hydralazine to 50 mg tid abd 2 more weeks of bp log    Message text    Isra Mendoza MD 3 days ago   ES  Please advise    Routing comment      TC to pt, ID verified. Advised pt as above per Dr. Pilar Banuelos. Pt does not want to increase Hydralazine, he is happy with where his blood pressure is. Advised there is increased risk of stroke with increased blood pressure. Pt still refused increased medications. Will let Dr. Pilar Banuelos know.

## 2021-09-21 NOTE — TELEPHONE ENCOUNTER
Brian Haile MD  You 15 hours ago (4:37 PM)   MG  Ok thanks    Message text    Cielo Arauz MD 8 days ago   ES  MACARIOI he refused increased medications    Routing comment

## 2022-01-10 RX ORDER — LISINOPRIL 20 MG/1
TABLET ORAL
Qty: 180 TABLET | Refills: 1 | Status: SHIPPED | OUTPATIENT
Start: 2022-01-10

## 2022-01-10 RX ORDER — HYDRALAZINE HYDROCHLORIDE 50 MG/1
TABLET, FILM COATED ORAL
Qty: 180 TABLET | Refills: 1 | Status: SHIPPED | OUTPATIENT
Start: 2022-01-10

## 2022-01-10 NOTE — TELEPHONE ENCOUNTER
Cardiologist: Dr. Elier Arellano    Last appt: 2/21/2020  No future appointments. Requested Prescriptions     Signed Prescriptions Disp Refills    lisinopriL (PRINIVIL, ZESTRIL) 20 mg tablet 180 Tablet 1     Sig: TAKE 1 TABLET TWICE A DAY     Authorizing Provider: Pb Rodriguez     Ordering User: NIKOLAY EDWARDS         Refills VO per Dr. Elier Arellano.

## 2022-01-10 NOTE — TELEPHONE ENCOUNTER
Cardiologist: Dr. Chris Zamarripa    Last appt: 8/3/2021  No future appointments. Requested Prescriptions     Signed Prescriptions Disp Refills    hydrALAZINE (APRESOLINE) 50 mg tablet 180 Tablet 1     Sig: TAKE 1 TABLET TWICE A DAY     Authorizing Provider: Lindy Baker     Ordering User: NIKOLAY EDWARDS         Refaminah VO per Dr. Chris Zamarripa.

## 2023-03-22 RX ORDER — VALSARTAN AND HYDROCHLOROTHIAZIDE 320; 25 MG/1; MG/1
1 TABLET, FILM COATED ORAL DAILY
COMMUNITY

## 2023-03-22 RX ORDER — ASPIRIN 81 MG/1
81 TABLET ORAL DAILY
COMMUNITY

## 2023-03-22 RX ORDER — VALSARTAN 320 MG/1
320 TABLET ORAL DAILY
COMMUNITY
End: 2023-03-22

## 2023-03-22 NOTE — PERIOP NOTES
[facility]  Ambulatory Surgery Unit  Pre-operative Instructions    Surgery/Procedure Date  4/3/2023            Tentative Arrival Time TBD      1. On the day of your surgery/procedure, please report to the Ambulatory Surgery Unit Registration Desk and sign in at your designated time. The Ambulatory Surgery Unit is located in [facility] on the [facility] side of the [facility] across from the [address] [place] [address]. Please have all of your health insurance cards, co-payment, and a photo ID.    **TWO adults may accompany you the day of the procedure. We have limited seating available. If our waiting room is at capacity, your ride may be asked to remain in their vehicle. No one under 15 is allowed in the waiting room. 2. You must have someone with you to drive you home, as you should not drive a car for 24 hours following anesthesia. Please make arrangements for a responsible adult friend or family member to stay with you for at least the first 24 hours after your surgery. 3. Do not have anything to eat or drink (including water, gum, mints, coffee, juice) after 11:59 PM  4/2/2023. This may not apply to medications prescribed by your physician. (Please note below the special instructions with medications to take the morning of surgery, if applicable.)    4. We recommend you do not drink any alcoholic beverages for 24 hours before and after your surgery. 5. Contact your surgeons office for instructions on the following medications: non-steroidal anti-inflammatory drugs (i.e. Advil, Aleve), vitamins, and supplements. (Some surgeons will want you to stop these medications prior to surgery and others may allow you to take them)   **If you are currently taking Plavix, Coumadin, Aspirin and/or other blood-thinning agents, contact your surgeon for instructions. ** Your surgeon will partner with the physician prescribing these medications to determine if it is safe to stop or if you need to continue taking. Please do not stop taking these medications without instructions from your surgeon. 6.  See Hibiclens Bathing Instructions. 7. Wear comfortable clothes. Wear glasses instead of contacts. Do not bring any jewelry or money (other than copays or fees as instructed). Do not wear make-up, particularly mascara, the morning of your surgery. Do not wear nail polish, particularly if you are having foot /hand surgery. Wear your hair loose or down, no ponytails, buns, arpita pins or clips. All body piercings must be removed. 8. You should understand that if you do not follow these instructions your surgery may be cancelled. If your physical condition changes (i.e. fever, cold or flu) please contact your surgeon as soon as possible. 9. It is important that you be on time. If a situation occurs where you may be late, or if you have any questions or problems, please call [phone].    10. Your surgery time may be subject to change. You will receive a phone call the day prior to surgery to confirm your arrival time. 11. Pediatric patients: please bring a change of clothes, diapers, bottle/sippy cup, pacifier, etc.      Special Instructions: Take all medications and inhalers, as prescribed, on the morning of surgery with a sip of water EXCEPT: Diabetic medications      Insulin Dependent Diabetic patients: Take your diabetic medications as prescribed the day before surgery. Hold all diabetic medications the day of surgery. If you are scheduled to arrive for surgery after 8:00 AM, and your AM blood sugar is >200, please call Ambulatory Surgery. I understand a pre-operative phone call will be made to verify my surgery time. In the event that I am not available, I give permission for a message to be left on my answering service and/or with another person?       Yes      Reviewed instructions with patient , able to verbalized understanding.          ___________________ ___________________      ________________  (Signature of Patient)          (Witness)                   (Date and Time)

## 2023-03-23 NOTE — PERIOP NOTES
Hibiclens/Chlorhexidine    Preventing Infections Before and After - Your Surgery    IMPORTANT INSTRUCTIONS    Please read and follow these instructions carefully. If you are unable to comply with the below instructions your procedure will be cancelled. Every Night for Three (3) nights before your surgery:  Shower with an antibacterial soap, such as Dial, or the soap provided at your preassessment appointment. A shower is better than a bath for cleaning your skin. If needed, ask someone to help you reach all areas of your body. Dont forget to clean your belly button with every shower. The night before your surgery: If you lose your Hibiclens/chlorhexidine please contact surgery center or you can purchase it at a local pharmacy  On the night before your surgery, shower with an antibacterial soap, such as Dial, or the soap provided at your preassessment appointment. With one packet of Hibiclens/Chlorhexidine in hand, turn water off. Apply Hibiclens antiseptic skin cleanser with a clean, freshly washed washcloth. Gently apply to your body from chin to toes (except the genital area) and especially the area(s) where your incision(s) will be. Leave Hibiclens/Chlorhexidine on your skin for at least 20 seconds. CAUTION: If needed, Hibiclens/chlorhexidine may be used to clean the folds of skin of the legs (such as in the area of the groin) and on your buttocks and hips. However, do not use Hibiclens/Chlorhexidine above the neck or in the genital area (your bottom) or put inside any area of your body. Turn the water back on and rinse. Dry gently with a clean, freshly washed towel. After your shower, do not use any powder, deodorant, perfumes or lotion. Use clean, freshly washed towels and washcloths every time you shower. Wear clean, freshly washed pajamas to bed the night before surgery. Sleep on clean, freshly washed sheets. Do not allow pets to sleep in your bed with you.         The Morning of your surgery:  Shower again thoroughly with an antibacterial soap, such as Dial or the soap provided at your preassessment appointment. If needed, ask someone for help to reach all areas of your body. Dont forget to clean your belly button! Rinse. Dry gently with a clean, freshly washed towel. After your shower, do not use any powder, deodorant, perfumes or lotion prior to surgery. Put on clean, freshly washed clothing. Tips to help prevent infections after your surgery:  Protect your surgical wound from germs:  Hand washing is the most important thing you and your caregivers can do to prevent infections. Keep your bandage clean and dry! Do not touch your surgical wound. Use clean, freshly washed towels and washcloths every time you shower; do not share bath linens with others. Until your surgical wound is healed, wear clothing and sleep on bed linens each day that are clean and freshly washed. Do not allow pets to sleep in your bed with you or touch your surgical wound. Do not smoke - smoking delays wound healing. This may be a good time to stop smoking. If you have diabetes, it is important for you to manage your blood sugar levels properly before your surgery as well as after your surgery. Poorly managed blood sugar levels slow down wound healing and prevent you from healing completely. If you lose your Hibiclens/chlorhexidine, please call the [facility], or it is available for purchase at your pharmacy.         Addendum: late Entry from yesterday 3/22/2023.               ___________________      ___________________      ________________  (Signature of Patient)          (Witness)                   (Date and Time)

## 2023-03-23 NOTE — PERIOP NOTES
Spoke to patient yesterday, and as per same he has Chlorhexidine soap at home. Bathing instructions give, able to verbalized understanding. Holly Fowler

## 2023-03-31 ENCOUNTER — ANESTHESIA EVENT (OUTPATIENT)
Dept: SURGERY | Age: 78
End: 2023-03-31
Payer: MEDICARE

## 2023-04-03 ENCOUNTER — ANESTHESIA (OUTPATIENT)
Dept: SURGERY | Age: 78
End: 2023-04-03
Payer: MEDICARE

## 2023-04-03 ENCOUNTER — HOSPITAL ENCOUNTER (OUTPATIENT)
Age: 78
Setting detail: OUTPATIENT SURGERY
Discharge: HOME | End: 2023-04-03
Attending: OPHTHALMOLOGY | Admitting: OPHTHALMOLOGY
Payer: MEDICARE

## 2023-04-03 LAB
GLUCOSE BLD STRIP.AUTO-MCNC: 234 MG/DL (ref 65–117)
SERVICE CMNT-IMP: ABNORMAL

## 2023-04-03 PROCEDURE — 76210000034 HC AMBSU PH I REC 0.5 TO 1 HR: Performed by: OPHTHALMOLOGY

## 2023-04-03 PROCEDURE — 74011250636 HC RX REV CODE- 250/636: Performed by: NURSE ANESTHETIST, CERTIFIED REGISTERED

## 2023-04-03 PROCEDURE — 76210000046 HC AMBSU PH II REC FIRST 0.5 HR: Performed by: OPHTHALMOLOGY

## 2023-04-03 PROCEDURE — 74011250636 HC RX REV CODE- 250/636: Performed by: OPHTHALMOLOGY

## 2023-04-03 PROCEDURE — 74011000250 HC RX REV CODE- 250

## 2023-04-03 PROCEDURE — 74011000250 HC RX REV CODE- 250: Performed by: OPHTHALMOLOGY

## 2023-04-03 PROCEDURE — 77030022390 HC RETRCTR MALYGN RNG MSRT -C: Performed by: OPHTHALMOLOGY

## 2023-04-03 PROCEDURE — 2709999900 HC NON-CHARGEABLE SUPPLY: Performed by: OPHTHALMOLOGY

## 2023-04-03 PROCEDURE — V2632 POST CHMBR INTRAOCULAR LENS: HCPCS | Performed by: OPHTHALMOLOGY

## 2023-04-03 PROCEDURE — 77030028792 HC AGNT OPHTH DUOVISC ALCN -B: Performed by: OPHTHALMOLOGY

## 2023-04-03 PROCEDURE — 82962 GLUCOSE BLOOD TEST: CPT

## 2023-04-03 PROCEDURE — 76030000000 HC AMB SURG OR TIME 0.5 TO 1: Performed by: OPHTHALMOLOGY

## 2023-04-03 PROCEDURE — 76060000061 HC AMB SURG ANES 0.5 TO 1 HR: Performed by: OPHTHALMOLOGY

## 2023-04-03 DEVICE — STERILE UV AND BLUE LIGHT FILTERING ACRYLIC FOLDABLE ASPHERIC POSTERIOR CHAMBER INTRAOCULAR LENS
Type: IMPLANTABLE DEVICE | Site: EYE | Status: FUNCTIONAL
Brand: CLAREON

## 2023-04-03 RX ORDER — SODIUM CHLORIDE 9 MG/ML
25 INJECTION, SOLUTION INTRAVENOUS CONTINUOUS
Status: DISCONTINUED | OUTPATIENT
Start: 2023-04-03 | End: 2023-04-03 | Stop reason: HOSPADM

## 2023-04-03 RX ORDER — FENTANYL CITRATE 50 UG/ML
25 INJECTION, SOLUTION INTRAMUSCULAR; INTRAVENOUS
Status: DISCONTINUED | OUTPATIENT
Start: 2023-04-03 | End: 2023-04-03 | Stop reason: HOSPADM

## 2023-04-03 RX ORDER — TROPICAMIDE 10 MG/ML
1 SOLUTION/ DROPS OPHTHALMIC
Status: COMPLETED | OUTPATIENT
Start: 2023-04-03 | End: 2023-04-03

## 2023-04-03 RX ORDER — OXYCODONE HYDROCHLORIDE 5 MG/1
5 TABLET ORAL AS NEEDED
Status: DISCONTINUED | OUTPATIENT
Start: 2023-04-03 | End: 2023-04-03 | Stop reason: HOSPADM

## 2023-04-03 RX ORDER — CIPROFLOXACIN HYDROCHLORIDE 3.5 MG/ML
1 SOLUTION/ DROPS TOPICAL
Status: DISCONTINUED | OUTPATIENT
Start: 2023-04-03 | End: 2023-04-03 | Stop reason: HOSPADM

## 2023-04-03 RX ORDER — TROPICAMIDE 10 MG/ML
SOLUTION/ DROPS OPHTHALMIC
Status: COMPLETED
Start: 2023-04-03 | End: 2023-04-03

## 2023-04-03 RX ORDER — TETRACAINE HYDROCHLORIDE 5 MG/ML
1 SOLUTION OPHTHALMIC
Status: ACTIVE | OUTPATIENT
Start: 2023-04-03 | End: 2023-04-03

## 2023-04-03 RX ORDER — ONDANSETRON 2 MG/ML
4 INJECTION INTRAMUSCULAR; INTRAVENOUS AS NEEDED
Status: DISCONTINUED | OUTPATIENT
Start: 2023-04-03 | End: 2023-04-03 | Stop reason: HOSPADM

## 2023-04-03 RX ORDER — LIDOCAINE HYDROCHLORIDE 10 MG/ML
0.1 INJECTION, SOLUTION EPIDURAL; INFILTRATION; INTRACAUDAL; PERINEURAL AS NEEDED
Status: DISCONTINUED | OUTPATIENT
Start: 2023-04-03 | End: 2023-04-03 | Stop reason: HOSPADM

## 2023-04-03 RX ORDER — TIMOLOL MALEATE 5 MG/ML
1 SOLUTION/ DROPS OPHTHALMIC 2 TIMES DAILY
Status: DISCONTINUED | OUTPATIENT
Start: 2023-04-03 | End: 2023-04-03 | Stop reason: HOSPADM

## 2023-04-03 RX ORDER — NEOMYCIN SULFATE, POLYMYXIN B SULFATE, AND DEXAMETHASONE 3.5; 10000; 1 MG/G; [USP'U]/G; MG/G
OINTMENT OPHTHALMIC ONCE
Status: COMPLETED | OUTPATIENT
Start: 2023-04-03 | End: 2023-04-03

## 2023-04-03 RX ORDER — MIDAZOLAM HYDROCHLORIDE 1 MG/ML
INJECTION, SOLUTION INTRAMUSCULAR; INTRAVENOUS AS NEEDED
Status: DISCONTINUED | OUTPATIENT
Start: 2023-04-03 | End: 2023-04-03 | Stop reason: HOSPADM

## 2023-04-03 RX ORDER — SODIUM CHLORIDE, SODIUM LACTATE, POTASSIUM CHLORIDE, CALCIUM CHLORIDE 600; 310; 30; 20 MG/100ML; MG/100ML; MG/100ML; MG/100ML
25 INJECTION, SOLUTION INTRAVENOUS CONTINUOUS
Status: DISCONTINUED | OUTPATIENT
Start: 2023-04-03 | End: 2023-04-03 | Stop reason: HOSPADM

## 2023-04-03 RX ORDER — ONDANSETRON 2 MG/ML
INJECTION INTRAMUSCULAR; INTRAVENOUS AS NEEDED
Status: DISCONTINUED | OUTPATIENT
Start: 2023-04-03 | End: 2023-04-03 | Stop reason: HOSPADM

## 2023-04-03 RX ADMIN — CIPROFLOXACIN 1 DROP: 3 SOLUTION OPHTHALMIC at 08:27

## 2023-04-03 RX ADMIN — TROPICAMIDE 1 DROP: 10 SOLUTION/ DROPS OPHTHALMIC at 08:27

## 2023-04-03 RX ADMIN — TROPICAMIDE 1 DROP: 10 SOLUTION/ DROPS OPHTHALMIC at 08:22

## 2023-04-03 RX ADMIN — SODIUM CHLORIDE 25 ML/HR: 9 INJECTION, SOLUTION INTRAVENOUS at 08:12

## 2023-04-03 RX ADMIN — CIPROFLOXACIN 1 DROP: 3 SOLUTION OPHTHALMIC at 08:12

## 2023-04-03 RX ADMIN — MIDAZOLAM HYDROCHLORIDE 1 MG: 1 INJECTION, SOLUTION INTRAMUSCULAR; INTRAVENOUS at 08:40

## 2023-04-03 RX ADMIN — CEFUROXIME SODIUM 1.5 MG: 750 INJECTION, POWDER, FOR SOLUTION INTRAMUSCULAR; INTRAVENOUS at 09:03

## 2023-04-03 RX ADMIN — TROPICAMIDE 1 DROP: 10 SOLUTION/ DROPS OPHTHALMIC at 08:12

## 2023-04-03 RX ADMIN — CIPROFLOXACIN 1 DROP: 3 SOLUTION OPHTHALMIC at 08:22

## 2023-04-03 RX ADMIN — ONDANSETRON HYDROCHLORIDE 4 MG: 2 INJECTION, SOLUTION INTRAMUSCULAR; INTRAVENOUS at 08:51

## 2023-04-03 RX ADMIN — MIDAZOLAM HYDROCHLORIDE 1 MG: 1 INJECTION, SOLUTION INTRAMUSCULAR; INTRAVENOUS at 08:50

## 2023-04-03 NOTE — DISCHARGE INSTRUCTIONS
Israel Munoz MD  [address] [place] [address] Drive   HARVEY Kouu, [address]  Phone: [phone]  If you are unable to keep appointment, kindly give 24 hours notice please. REMOVE PATCH  START DROPS WHEN YOU GET HOME  PUT PATCH BACK ON AT BEDTIME    DO NOT RUB the eye that was operated on. Do not strain excessively. It is all right to bend as long as you do not strain. It is safe to take a shower, wash your face, and wash your hair. Just keep the eye closed. Do not swim for 1 week after surgery. If you have any problems or questions, do not hesitate to call [phone]. Follow instructions on eye drops from office. You may take Tylenol or Advil for discomfort. If it pressure not relieved by Tylenol or Advil, please call Dr. Kim Decker office. TO PREVENT AN INFECTION      [address] YOUR HANDS    To prevent infection, good handwashing is the most important thing you or your caregiver can do. Wash your hands with soap and water or use the hand  we gave you before you touch any wounds. USE CLEAN SHEETS    Use freshly cleaned sheets on your bed after surgery. To keep the surgery site clean, do not allow pets to sleep with you while your wound is still healing. STOP SMOKING    Stop smoking, or at least cut back on smoking    Smoking slows your healing. CONTROL YOUR BLOOD SUGAR    High blood sugars slow wound healing. If you are diabetic, control your blood sugar levels before and after your surgery. If you were given prescriptions, please review the written information on the prescribed medications. DO NOT DRIVE WHILE TAKING NARCOTIC PAIN MEDICATIONS. DISCHARGE SUMMARY from Nurse    The following personal items collected during your admission are returned to you:   Dental Appliance: Dental Appliances: None  Vision: Visual Aid: None  Hearing Aid:    Jewelry: Jewelry: None  Clothing: Clothing: With patient  Other Valuables:  Other Valuables: With patient, Other (comment) (Right lower prothesis)  Valuables sent to safe:      PATIENT INSTRUCTIONS:    After general anesthesia or intravenous sedation, for 24 hours or while taking prescription Narcotics:  Someone should be with you for the next 24 hours. For your own safety, a responsible adult must drive you home. Limit your activities  Recommended activity: Rest today, Do not climb stairs or shower unattended for the next 24 hours. Do not drive and operate hazardous machinery  Do not make important personal or business decisions  Do  not drink alcoholic beverages  If you have not urinated within 8 hours after discharge, please contact your surgeon on call. Report the following to your surgeon:  Excessive pain, swelling, redness or odor of or around the surgical area  Temperature over 100.5  Nausea and vomiting lasting longer than 4 hours or if unable to take medications    You will receive a Post Operative Call from one of the Recovery Room Nurses on the day after your surgery to check on you. It is very important for us to know how you are recovering after your surgery. You may receive an e-mail or letter in the mail from Edson Ulloa regarding your experience with us in the Ambulatory Surgery Unit. Your feedback is valuable to us and we appreciate your participation in the survey. We wish youre a speedy recovery ? What to do at Home:      *  Please give a list of your current medications to your Primary Care Provider. *  Please update this list whenever your medications are discontinued, doses are      changed, or new medications (including over-the-counter products) are added. *  Please carry medication information at all times in case of emergency situations.           These are general instructions for a healthy lifestyle:    No smoking/ No tobacco products/ Avoid exposure to second hand smoke    Surgeon General's Warning:  Quitting smoking now greatly reduces serious risk to your health. Obesity, smoking, and sedentary lifestyle greatly increases your risk for illness    A healthy diet, regular physical exercise & weight monitoring are important for maintaining a healthy lifestyle    You may be retaining fluid if you have a history of heart failure or if you experience any of the following symptoms:  Weight gain of 3 pounds or more overnight or 5 pounds in a week, increased swelling in our hands or feet or shortness of breath while lying flat in bed. Please call your doctor as soon as you notice any of these symptoms; do not wait until your next office visit. Recognize signs and symptoms of STROKE:    B - Balance  E - Eyes    F-face looks uneven    A-arms unable to move or move even    S-speech slurred or non-existent    T-time-call 911 as soon as signs and symptoms begin-DO NOT go       Back to bed or wait to see if you get better-TIME IS BRAIN. If you have not received your influenza and/or pneumococcal vaccine, please follow up with your primary care physician. The discharge information has been reviewed with the patient and caregiver. The patient and caregiver verbalized understanding.

## 2023-04-03 NOTE — OP NOTES
Operative Note    Patient: Rylee Vazquez  YOB: 1945  MRN: [id number]    Date of Procedure: 4/3/2023     Pre-Op Diagnosis: Nuclear sclerotic cataract of right eye [H25.11]    Post-Op Diagnosis: Nuclear sclerotic cataract of right eye [H25.11]    Procedure(s):  RIGHT CATARACT EXTRACTION WITH INTRA OCULAR LENS IMPLANT FIRST EYE (05349 complex)    Surgeon(s):  Viky Tucker MD    Surgical Assistant: None    Anesthesia: MAC     Estimated Blood Loss (mL):  0    Complications: none    Specimens: * No specimens in log *     Implants:   Implant Name Type Inv. Item Serial No.  Lot No. LRB No. Used Action   BRITTANY [id number] +19.0 CLAREON IOL Other  [id number] [employer] N/A Right 1 Implanted       Drains: * No LDAs found *    Findings: Visually significant cataract right eye    Detailed Description of Procedure:     Preoperative Diagnosis: Nuclear Sclerotic cataract right eye H25.11  Postoperative Diagnosis: Nuclear Sclerotic cataract right eye H25.11  Procedure: Extracapsular cataract extraction with lens implant right eye, complex code 82687  Surgeon:  RICKI Atwood MD  Assistants: None  Anesthesia: MAC with local  Estimated Blood Loss: None  Findings: Cataract right eye  Complications: None  Specimens: None  Prosthetic Devices: Intraocular lens implant     The patient's right eye was dilated with mydriacyl 1% and ciprofloxacin 0.3% for 3 doses preoperatively. The patient was taken to the operating room and was given sedation. Tetracaine was given topically to the right eye, and the eye was prepped and draped in the usual manner for sterile eye surgery, including Betadine solution being dropped onto the conjunctiva at the beginning of the prep. The eyelashes were isolated with a plastic drape. A lid speculum was placed. Limbal relaxing incisions were made at the beginning of the case.   A corneal marking gauge was used to make a 45 degree arc length at the 15 degree axis temporally and nasally. After the marking was made, as small amount of Viscoat (Duovisc) was placed on the incision sites, and a 600 micron depth antoinette knife was used to make the 45 degree arc nasally and temporally one half millimeters in from the limbus. The pupil failed to dilate more that 3 mm and a paracentesis was created with the #15 blade at the 10:00 location. The eye was flushed with a lidocaine / epinephrine mixture (\"Shugarcaine\"). The eye was then filled with Viscoat (Duovisc), and a crescent blade was used to make a 2.5 mm incision at the limbus temporally. This was dissected 2 mm into clear cornea, and the eye was entered with a 2.4 mm keratome. A 0.12 forceps was used for fixation during the procedure. The Malyugin Ring was then placed to dilate the pupil. The injector was inserted into the incision and the nasal loop then engaged the iris. This was followed by the superior and inferior loops. The  was then removed and the temporal loop was enaged with the hooked instrument and the ring was centered over the pupil. The lens nucleus was removed using phacoemulsification with a total phaco time of 1:42 minutes at 9.6%. The lens was cracked and manipulated with a Sinsky hook. Residual cortex was removed using irrigation / aspiration. The capsule remained intact. The capsule was refilled with Provisc, and an Zheng Intraocular lens model SY60WF power 19.0 was placed in a lens folding cartridge with Provisc. The lens was unfolded into the capsular bag. The lens centered well. The ring was then removed by disengaging the loops from the iris and the re-engaging the  and the ring was retracted into the injector. There was no hemmorrhage from the iris. Residual Provisc and Viscoat were removed using I / A. After light wound hydration no suture was required to close the incision. The eye was flushed with BSS through the paracentesis.   Cefuroxime at 0.3 ml / 0.3 ml was injected through the paracentesis. Betadine solution was placed on the conjunctival surface at the end of the case. The eye was left soft and formed at the end of the case. The incision site was water tight. The speculum was removed, and a drop of timolol 0.5% and neomycin/polymixin/dexamethasone ointment was placed on the eye followed by a shield. The patient tolerated the procedure well and is to follow-up in one day.     Electronically Signed by Tayler Morgan MD on 4/3/2023 at 9:30 AM 71 year old male  with right hip pain 8/10 radiating to his back and knee for the last few years progressively got worse for the last 8 months, No steroid injections in the past,  he states walking is the most painful activity, occasionally takes OTC Advil, he is scheduled for a Right Shamar Total Hip replacement Direct anterior approach by dr. Gasca on 10/3/22. Covid vaccine series completed, card in chart,( Pfizer X3)  covid test is on 22. Medical Clearance pending.    medications reviewed, instructions given on what medications to take and what not to take. Asked the patient to consult with PCP dr. mar  about holding ASA  and stop/Hold it accordingly, failure to do so may result in cancellation or postponement of your surgery, pt  agreed and verbalized understanding. All other meds can be continued till the night before surgery. ERP teaching given and the pt verbalized understanding. Asked the pt not to take any NSAID's 5-7 days before surgery and told the pt Tylenol is okay to take for pain, pt verbalized understanding.   CAPRINI VTE 2.0 SCORE [CLOT updated 2019]    AGE RELATED RISK FACTORS                                                       MOBILITY RELATED FACTORS  [ ] Age 41-60 years                                            (1 Point)                    [ ] Bed rest                                                        (1 Point)  [x ] Age: 61-74 years                                           (2 Points)                  [ ] Plaster cast                                                   (2 Points)  [ ] Age= 75 years                                              (3 Points)                    [ ] Bed bound for more than 72 hours                 (2 Points)    DISEASE RELATED RISK FACTORS                                               GENDER SPECIFIC FACTORS  [ ] Edema in the lower extremities                       (1 Point)              [ ] Pregnancy                                                     (1 Point)  [ ] Varicose veins                                               (1 Point)                     [ ] Post-partum < 6 weeks                                   (1 Point)             [x ] BMI > 25 Kg/m2                                            (1 Point)                     [ ] Hormonal therapy  or oral contraception          (1 Point)                 [ ] Sepsis (in the previous month)                        (1 Point)               [ ] History of pregnancy complications                 (1 point)  [ ] Pneumonia or serious lung disease                                               [ ] Unexplained or recurrent                     (1 Point)           (in the previous month)                               (1 Point)  [ ] Abnormal pulmonary function test                     (1 Point)                 SURGERY RELATED RISK FACTORS  [ ] Acute myocardial infarction                              (1 Point)               [ ]  Section                                             (1 Point)  [ ] Congestive heart failure (in the previous month)  (1 Point)      [ ] Minor surgery                                                  (1 Point)   [ ] Inflammatory bowel disease                             (1 Point)               [ ] Arthroscopic surgery                                        (2 Points)  [ ] Central venous access                                      (2 Points)                [ ] General surgery lasting more than 45 minutes (2 points)  [ ] Malignancy- Present or previous                   (2 Points)                [x ] Elective arthroplasty                                         (5 points)    [ ] Stroke (in the previous month)                          (5 Points)                                                                                                                                                           HEMATOLOGY RELATED FACTORS                                                 TRAUMA RELATED RISK FACTORS  [ ] Prior episodes of VTE                                     (3 Points)                [ ] Fracture of the hip, pelvis, or leg                       (5 Points)  [ ] Positive family history for VTE                         (3 Points)             [ ] Acute spinal cord injury (in the previous month)  (5 Points)  [ ] Prothrombin 83754 A                                     (3 Points)               [ ] Paralysis  (less than 1 month)                             (5 Points)  [ ] Factor V Leiden                                             (3 Points)                  [ ] Multiple Trauma within 1 month                        (5 Points)  [ ] Lupus anticoagulants                                     (3 Points)                                                           [ ] Anticardiolipin antibodies                               (3 Points)                                                       [ ] High homocysteine in the blood                      (3 Points)                                             [ ] Other congenital or acquired thrombophilia      (3 Points)                                                [ ] Heparin induced thrombocytopenia                  (3 Points)                                     Total Score [    8      ]  OPIOID RISK TOOL    BEENA EACH BOX THAT APPLIES AND ADD TOTALS AT THE END    FAMILY HISTORY OF SUBSTANCE ABUSE                 FEMALE         MALE                                                Alcohol                             [  ]1 pt          [  ]3pts                                               Illegal Drugs                     [  ]2 pts        [  ]3pts                                               Rx Drugs                           [  ]4 pts        [  ]4 pts    PERSONAL HISTORY OF SUBSTANCE ABUSE                                                                                          Alcohol                             [  ]3 pts       [  ]3 pts                                               Illegal Drugs                     [  ]4 pts        [  ]4 pts                                               Rx Drugs                           [  ]5 pts        [  ]5 pts    AGE BETWEEN 16-45 YEARS                                      [  ]1 pt         [  ]1 pt    HISTORY OF PREADOLESCENT   SEXUAL ABUSE                                                             [  ]3 pts        [  ]0pts    PSYCHOLOGICAL DISEASE                     ADD, OCD, Bipolar, Schizophrenia        [  ]2 pts         [  ]2 pts                      Depression                                               [  ]1 pt           [  ]1 pt           SCORING TOTAL   (add numbers and type here)              ( 0)                                     A score of 3 or lower indicated LOW risk for future opioid abuse  A score of 4 to 7 indicated moderate risk for future opioid abuse  A score of 8 or higher indicates a high risk for opioid abuse

## 2023-04-03 NOTE — ANESTHESIA POSTPROCEDURE EVALUATION
Procedure(s):  RIGHT CATARACT EXTRACTION WITH INTRA OCULAR LENS IMPLANT FIRST EYE. MAC    Anesthesia Post Evaluation      Multimodal analgesia: multimodal analgesia used between 6 hours prior to anesthesia start to PACU discharge  Patient location during evaluation: PACU  Patient participation: complete - patient participated  Level of consciousness: awake and alert  Pain score: 0  Airway patency: patent  Anesthetic complications: no  Cardiovascular status: acceptable and hypertensive  Respiratory status: acceptable  Hydration status: acceptable  Comments: Pt hypertensive pre & postop. Did take am BP meds. Blood glucose also running high.   Post anesthesia nausea and vomiting:  none  Final Post Anesthesia Temperature Assessment:  Normothermia (36.0-37.5 degrees C)      INITIAL Post-op Vital signs:   Vitals Value Taken Time   /105 04/03/23 1000   Temp 36.4 °C (97.6 °F) 04/03/23 0926   Pulse 60 04/03/23 1000   Resp 20 04/03/23 1000   SpO2 98 % 04/03/23 1000

## 2023-04-03 NOTE — ANESTHESIA PREPROCEDURE EVALUATION
65 yo hx of afib, CAD s/p CABG, EF 50%, HTN, previous BKA. Now here for transmetatarsal amputation on the other foot.     Anesthetic History   No history of anesthetic complications            Review of Systems / Medical History  Patient summary reviewed, nursing notes reviewed and pertinent labs reviewed    Pulmonary  Within defined limits                 Neuro/Psych   Within defined limits           Cardiovascular    Hypertension      CHF: dyspnea on exertion  Dysrhythmias : atrial fibrillation  CAD, PAD, CABG (2016) and hyperlipidemia    Exercise tolerance: <4 METS  Comments: Chronic AFib; rate controlled  Has Watchman    Denies CP, palpitations  Mild ARENAS    04/21 ECHO= EF 50%, mod MR, mild TR     GI/Hepatic/Renal  Within defined limits              Endo/Other    Diabetes: poorly controlled, type 2         Other Findings   Comments: Cataracts    Diabetic neuropathy bilateral lower legs             Physical Exam    Airway  Mallampati: II  TM Distance: 4 - 6 cm  Neck ROM: normal range of motion   Mouth opening: Normal    Comments: Full beard Cardiovascular    Rhythm: irregular  Rate: normal         Dental    Dentition: Poor dentition     Pulmonary                Comments: Fine rales lu bases, otherwise CTA Abdominal  Abdominal exam normal       Other Findings            Anesthetic Plan    ASA: 3  Anesthesia type: MAC          Induction: Intravenous  Anesthetic plan and risks discussed with: Patient

## 2023-04-03 NOTE — BRIEF OP NOTE
Brief Postoperative Note    Patient: Lucas Nagel  YOB: 1945  MRN: [id number]    Date of Procedure: 4/3/2023     Pre-Op Diagnosis: Nuclear sclerotic cataract of right eye [H25.11]    Post-Op Diagnosis: Nuclear sclerotic cataract of right eye [H25.11]    Procedure(s):  RIGHT CATARACT EXTRACTION WITH INTRA OCULAR LENS IMPLANT FIRST EYE (69986)    Surgeon(s):  Fam Noel MD    Surgical Assistant: None    Anesthesia: MAC     Estimated Blood Loss (mL): 0    Complications: none    Specimens: * No specimens in log *     Implants:   Implant Name Type Inv.  Item Serial No.  Lot No. LRB No. Used Action   BRITTANY [id number] +19.0 CLAREON IOL Other  [id number] [employer]ANY [employer] N/A Right 1 Implanted       Drains: * No LDAs found *    Findings: Visually significant cataract right eye    Electronically Signed by Adina Triplett MD on 4/3/2023 at 9:35 AM

## 2023-04-03 NOTE — PERIOP NOTES
Permission received to review discharge instructions and discuss private health information with friend, Yani Espinal. Patient states that family/friend will be with them for at least 24 hours following today's procedure.

## 2023-05-29 RX ORDER — DUTASTERIDE 0.5 MG/1
0.5 CAPSULE, LIQUID FILLED ORAL DAILY
COMMUNITY
Start: 2021-07-20

## 2023-05-29 RX ORDER — INSULIN LISPRO 100 [IU]/ML
8 INJECTION, SOLUTION INTRAVENOUS; SUBCUTANEOUS
COMMUNITY
Start: 2016-07-20

## 2023-05-29 RX ORDER — VALSARTAN AND HYDROCHLOROTHIAZIDE 320; 25 MG/1; MG/1
1 TABLET, FILM COATED ORAL DAILY
COMMUNITY

## 2023-05-29 RX ORDER — INSULIN GLARGINE 100 [IU]/ML
14 INJECTION, SOLUTION SUBCUTANEOUS
COMMUNITY

## 2023-05-29 RX ORDER — ASPIRIN 81 MG/1
81 TABLET ORAL DAILY
COMMUNITY

## 2023-05-29 RX ORDER — NIACIN 500 MG
500 TABLET ORAL
COMMUNITY

## 2023-05-29 RX ORDER — TAMSULOSIN HYDROCHLORIDE 0.4 MG/1
0.4 CAPSULE ORAL DAILY
COMMUNITY

## 2023-05-29 RX ORDER — ASCORBIC ACID 500 MG
500 TABLET ORAL DAILY
COMMUNITY

## 2023-05-29 RX ORDER — MAGNESIUM OXIDE 400 MG/1
400 TABLET ORAL DAILY
COMMUNITY

## 2023-05-29 RX ORDER — PERPHENAZINE 16 MG
300 TABLET ORAL DAILY
COMMUNITY

## 2023-05-29 RX ORDER — UREA 10 %
800 LOTION (ML) TOPICAL DAILY
COMMUNITY

## 2024-05-09 NOTE — PATIENT INSTRUCTIONS
Report to ER. Increase lisinopril to 20 mg twice a day. Schedule BP check in 1 week. Schedule limited echocardiogram and bilateral carotid doppler. Follow up with Dr. Jamesetta Ahumada in 1 month. Bed/Stretcher in lowest position, wheels locked, appropriate side rails in place/Call bell, personal items and telephone in reach/Instruct patient to call for assistance before getting out of bed/chair/stretcher/Non-slip footwear applied when patient is off stretcher/[place] to call system/Physically safe environment - no spills, clutter or unnecessary equipment/Purposeful proactive rounding/Room/bathroom lighting operational, light cord in reach

## 2025-04-04 ENCOUNTER — TRANSCRIBE ORDERS (OUTPATIENT)
Dept: SCHEDULING | Age: 80
End: 2025-04-04

## 2025-04-04 DIAGNOSIS — I50.9 HEART FAILURE, UNSPECIFIED HF CHRONICITY, UNSPECIFIED HEART FAILURE TYPE (HCC): Primary | ICD-10-CM

## 2025-04-09 ENCOUNTER — HOSPITAL ENCOUNTER (OUTPATIENT)
Facility: HOSPITAL | Age: 80
Discharge: HOME | End: 2025-04-11
Attending: INTERNAL MEDICINE
Payer: COMMERCIAL

## 2025-04-09 VITALS
SYSTOLIC BLOOD PRESSURE: 200 MMHG | DIASTOLIC BLOOD PRESSURE: 104 MMHG | WEIGHT: 197 LBS | BODY MASS INDEX: 28.2 KG/M2 | HEIGHT: 70 IN

## 2025-04-09 DIAGNOSIS — I50.9 HEART FAILURE, UNSPECIFIED HF CHRONICITY, UNSPECIFIED HEART FAILURE TYPE (HCC): ICD-10-CM

## 2025-04-09 LAB
ECHO AO ARCH DIAM: 2.2 CM
ECHO AO ASC DIAM: 4 CM
ECHO AO ASCENDING AORTA INDEX: 1.93 CM/M2
ECHO AO ROOT DIAM: 4 CM
ECHO AO ROOT INDEX: 1.93 CM/M2
ECHO AR MAX VEL PISA: 3.7 M/S
ECHO AV AREA PEAK VELOCITY: 1.7 CM2
ECHO AV AREA PEAK VELOCITY: 1.8 CM2
ECHO AV AREA VTI: 2 CM2
ECHO AV AREA/BSA VTI: 1 CM2/M2
ECHO AV MEAN GRADIENT: 4 MMHG
ECHO AV MEAN VELOCITY: 0.9 M/S
ECHO AV PEAK GRADIENT: 7 MMHG
ECHO AV PEAK GRADIENT: 8 MMHG
ECHO AV PEAK VELOCITY: 1.4 M/S
ECHO AV PEAK VELOCITY: 1.4 M/S
ECHO AV REGURGITANT PHT: 554.2 MS
ECHO AV VTI: 30.2 CM
ECHO BSA: 2.1 M2
ECHO EST RA PRESSURE: 8 MMHG
ECHO LA DIAMETER INDEX: 1.98 CM/M2
ECHO LA DIAMETER: 4.1 CM
ECHO LA TO AORTIC ROOT RATIO: 1.03
ECHO LA VOL A-L A2C: 83 ML (ref 18–58)
ECHO LA VOL A-L A4C: 90 ML (ref 18–58)
ECHO LA VOL BP: 81 ML (ref 18–58)
ECHO LA VOL MOD A2C: 80 ML (ref 18–58)
ECHO LA VOL MOD A4C: 82 ML (ref 18–58)
ECHO LA VOL/BSA BIPLANE: 39 ML/M2 (ref 16–34)
ECHO LA VOLUME AREA LENGTH: 86 ML
ECHO LA VOLUME INDEX A-L A2C: 40 ML/M2 (ref 16–34)
ECHO LA VOLUME INDEX A-L A4C: 43 ML/M2 (ref 16–34)
ECHO LA VOLUME INDEX AREA LENGTH: 42 ML/M2 (ref 16–34)
ECHO LA VOLUME INDEX MOD A2C: 39 ML/M2 (ref 16–34)
ECHO LA VOLUME INDEX MOD A4C: 40 ML/M2 (ref 16–34)
ECHO LV E' LATERAL VELOCITY: 9.88 CM/S
ECHO LV E' SEPTAL VELOCITY: 5.21 CM/S
ECHO LV EDV A2C: 107 ML
ECHO LV EDV A4C: 174 ML
ECHO LV EDV BP: 138 ML (ref 67–155)
ECHO LV EDV INDEX A4C: 84 ML/M2
ECHO LV EDV INDEX BP: 67 ML/M2
ECHO LV EDV NDEX A2C: 52 ML/M2
ECHO LV EF PHYSICIAN: 31 %
ECHO LV EJECTION FRACTION A2C: 24 %
ECHO LV EJECTION FRACTION A4C: 37 %
ECHO LV EJECTION FRACTION BIPLANE: 31 % (ref 55–100)
ECHO LV ESV A2C: 82 ML
ECHO LV ESV A4C: 110 ML
ECHO LV ESV BP: 95 ML (ref 22–58)
ECHO LV ESV INDEX A2C: 40 ML/M2
ECHO LV ESV INDEX A4C: 53 ML/M2
ECHO LV ESV INDEX BP: 46 ML/M2
ECHO LV FRACTIONAL SHORTENING: 21 % (ref 28–44)
ECHO LV INTERNAL DIMENSION DIASTOLE INDEX: 3.04 CM/M2
ECHO LV INTERNAL DIMENSION DIASTOLIC: 6.3 CM (ref 4.2–5.9)
ECHO LV INTERNAL DIMENSION SYSTOLIC INDEX: 2.42 CM/M2
ECHO LV INTERNAL DIMENSION SYSTOLIC: 5 CM
ECHO LV IVSD: 1.2 CM (ref 0.6–1)
ECHO LV MASS 2D: 321.8 G (ref 88–224)
ECHO LV MASS INDEX 2D: 155.4 G/M2 (ref 49–115)
ECHO LV POSTERIOR WALL DIASTOLIC: 1.1 CM (ref 0.6–1)
ECHO LV RELATIVE WALL THICKNESS RATIO: 0.35
ECHO LVOT AREA: 3.8 CM2
ECHO LVOT AV VTI INDEX: 0.52
ECHO LVOT DIAM: 2.2 CM
ECHO LVOT MEAN GRADIENT: 1 MMHG
ECHO LVOT PEAK GRADIENT: 2 MMHG
ECHO LVOT PEAK VELOCITY: 0.6 M/S
ECHO LVOT STROKE VOLUME INDEX: 29 ML/M2
ECHO LVOT SV: 60 ML
ECHO LVOT VTI: 15.8 CM
ECHO MV A VELOCITY: 0.37 M/S
ECHO MV E DECELERATION TIME (DT): 157.3 MS
ECHO MV E VELOCITY: 1.2 M/S
ECHO MV E/A RATIO: 3.24
ECHO MV E/E' LATERAL: 12.15
ECHO MV E/E' RATIO (AVERAGED): 17.59
ECHO MV E/E' SEPTAL: 23.03
ECHO MV EROA PISA: 0.1 CM2
ECHO MV REGURGITANT ALIASING (NYQUIST) VELOCITY: 32 CM/S
ECHO MV REGURGITANT PEAK VELOCITY: 5.7 M/S
ECHO MV REGURGITANT PEAK VELOCITY: 6 M/S
ECHO MV REGURGITANT RADIUS PISA: 0.46 CM
ECHO MV REGURGITANT VELOCITY PISA: 5.9 M/S
ECHO MV REGURGITANT VTIA: 224.6 CM
ECHO MV REGURGITANT VTIA: 227.3 CM
ECHO PULMONARY ARTERY END DIASTOLIC PRESSURE: 11 MMHG
ECHO PV MAX VELOCITY: 1 M/S
ECHO PV PEAK GRADIENT: 4 MMHG
ECHO PV REGURGITANT MAX VELOCITY: 1.7 M/S
ECHO RA AREA 4C: 23.9 CM2
ECHO RIGHT VENTRICULAR SYSTOLIC PRESSURE (RVSP): 51 MMHG
ECHO RV FREE WALL PEAK S': 6.8 CM/S
ECHO RV INTERNAL DIMENSION: 4 CM
ECHO RV TAPSE: 0.9 CM (ref 1.7–?)
ECHO TV REGURGITANT MAX VELOCITY: 3.27 M/S
ECHO TV REGURGITANT PEAK GRADIENT: 44 MMHG

## 2025-04-09 PROCEDURE — 93306 TTE W/DOPPLER COMPLETE: CPT

## (undated) DEVICE — TRAY PREP DRY W/ PREM GLV 2 APPL 6 SPNG 2 UNDPD 1 OVERWRAP

## (undated) DEVICE — SURGICAL PROCEDURE PACK EYE CUST DR CHNDLR

## (undated) DEVICE — COVER LT HNDL PLAS RIG 1 PER PK

## (undated) DEVICE — SYR LR LCK 1ML GRAD NSAF 30ML --

## (undated) DEVICE — AGENT OPHTH SURG DUOVISC 30MG/ML NAHA 0.35ML OR 0.5ML

## (undated) DEVICE — SUTURE MCRYL SZ 3-0 L27IN ABSRB UD L24MM PS-1 3/8 CIR PRIM Y936H

## (undated) DEVICE — PADDING CAST SOF-ROL 6INX4YD --

## (undated) DEVICE — 10 FRENCH DRAIN SYSTEM, STERILE: Brand: TLS

## (undated) DEVICE — Z DISCONTINUED PER MEDLINE (LOW STOCK)  USE 2422770 DRAPE C ARM W54XL78IN FOR FLROSCN

## (undated) DEVICE — THE MONARCH® "D" CARTRIDGE IS A SINGLE-USE POLYPROPYLENE CARTRIDGE FOR POSTERIOR CHAMBER IOL DELIVERY: Brand: MONARCH® III

## (undated) DEVICE — DRESSING,GAUZE,XEROFORM,CURAD,5"X9",ST: Brand: CURAD

## (undated) DEVICE — BANDAGE COMPR 9 FTX4 IN SMOOTH COMFORTABLE SYNTH ESMRK LF

## (undated) DEVICE — ADULT SPO2 SENSOR: Brand: NELLCOR

## (undated) DEVICE — PADDING CAST 4 INX5 YD STRL

## (undated) DEVICE — SOLUTION IRRIG 1000ML H2O STRL BLT

## (undated) DEVICE — SOLUTION IRRIG 500ML STRL H2O NONPYROGENIC

## (undated) DEVICE — TUBING SUCT 12FR MAL ALUM SHFT FN CAP VENT UNIV CONN W/ OBT

## (undated) DEVICE — SPONGE LAP 18X18IN STRL -- 5/PK

## (undated) DEVICE — SYSTEM EKG CBL L144IN 2 CONN 5 LD

## (undated) DEVICE — STRIP,CLOSURE,WOUND,MEDI-STRIP,1/4X3: Brand: MEDLINE

## (undated) DEVICE — SURGICAL PROCEDURE PACK CATRCT CUST

## (undated) DEVICE — PADDING CAST SPEC 6INX4YD COT --

## (undated) DEVICE — SUTURE ETHLN SZ 2-0 L30IN NONABSORBABLE BLK L36MM PSLX 3/8 1697H

## (undated) DEVICE — BANDAGE,GAUZE,BULKEE II,4.5"X4.1YD,STRL: Brand: MEDLINE

## (undated) DEVICE — Device: Brand: MALYUGIN RING SYSTEM 6.25MM

## (undated) DEVICE — PACK,BASIC,SIRUS,V: Brand: MEDLINE

## (undated) DEVICE — SOLUTION IRRIG 3000ML 0.9% SOD CHL FLX CONT 0797208] ICU MEDICAL INC]

## (undated) DEVICE — SUT ETHLN 3-0 18IN PS1 BLK --

## (undated) DEVICE — SMALL OSC. SAW BLADE, 9MM X 24.6MM X 0.38MM: Brand: MICROAIRE®

## (undated) DEVICE — STERILE POLYISOPRENE POWDER-FREE SURGICAL GLOVES WITH EMOLLIENT COATING: Brand: PROTEXIS

## (undated) DEVICE — SUTURE ETHLN SZ 4-0 L18IN NONABSORBABLE BLK L19MM PS-2 3/8 1667H

## (undated) DEVICE — DRAPE,EXTREMITY,89X128,STERILE: Brand: MEDLINE

## (undated) DEVICE — CATHETER ETER IV 22GA L1IN POLYUR STR RADPQ INTROCAN SFTY

## (undated) DEVICE — PAD,ABDOMINAL,5"X9",ST,LF,25/BX: Brand: MEDLINE INDUSTRIES, INC.

## (undated) DEVICE — 1010 S-DRAPE TOWEL DRAPE 10/BX: Brand: STERI-DRAPE™

## (undated) DEVICE — SUTURE MCRYL SZ 4-0 L27IN ABSRB UD L19MM PS-2 1/2 CIR PRIM Y426H

## (undated) DEVICE — ROCKER SWITCH PENCIL BLADE ELECTRODE, HOLSTER: Brand: EDGE

## (undated) DEVICE — REM POLYHESIVE ADULT PATIENT RETURN ELECTRODE: Brand: VALLEYLAB

## (undated) DEVICE — EXTREMITY III-LF: Brand: MEDLINE INDUSTRIES, INC.

## (undated) DEVICE — SUT ETHLN 2-0 18IN FS BLK --

## (undated) DEVICE — GOWN,SIRUS,NONRNF,SETINSLV,XL,20/CS: Brand: MEDLINE

## (undated) DEVICE — Z CONVERTED USE 2107985 COVER FLROSCP W36XL28IN 4 SIDE ADH

## (undated) DEVICE — SOLUTION IV SODIUM CHL 0.9% 250 ML INJ FLX CONTAINER

## (undated) DEVICE — INFECTION CONTROL KIT SYS

## (undated) DEVICE — HANDPIECE SET WITH BONE CLEANING TIP AND SUCTION TUBE: Brand: INTERPULSE

## (undated) DEVICE — SURGICAL PROCEDURE PACK BASIN MAJ SET CUST NO CAUT

## (undated) DEVICE — SPLINT MAT XF SPEC 5X30IN --

## (undated) DEVICE — BNDG ELAS HK LOOP 6X5YD NS -- MATRIX

## (undated) DEVICE — TOWEL SURG W17XL27IN STD BLU COT NONFENESTRATED PREWASHED

## (undated) DEVICE — DRAPE,REIN 53X77,STERILE: Brand: MEDLINE

## (undated) DEVICE — BANDAGE COMPR SELF ADH 5 YDX4 IN TAN STRL PREMIERPRO LF

## (undated) DEVICE — GLOVE SURG SZ 75 CRM LTX FREE POLYISOPRENE POLYMER BEAD ANTI

## (undated) DEVICE — SPONGE GZ W4XL4IN COT 12 PLY TYP VII WVN C FLD DSGN

## (undated) DEVICE — SOLUTION IV 1000ML 0.9% SOD CHL

## (undated) DEVICE — STERILE POLYISOPRENE POWDER-FREE SURGICAL GLOVES: Brand: PROTEXIS

## (undated) DEVICE — SUTURE VCRL SZ 3-0 L27IN ABSRB UD L26MM SH 1/2 CIR J416H

## (undated) DEVICE — ZIMMER® STERILE DISPOSABLE TOURNIQUET CUFF WITH PLC, DUAL PORT, SINGLE BLADDER, 18 IN. (46 CM)